# Patient Record
Sex: FEMALE | Race: WHITE | Employment: OTHER | URBAN - METROPOLITAN AREA
[De-identification: names, ages, dates, MRNs, and addresses within clinical notes are randomized per-mention and may not be internally consistent; named-entity substitution may affect disease eponyms.]

---

## 2020-08-24 ENCOUNTER — HOSPITAL ENCOUNTER (OUTPATIENT)
Dept: PHYSICAL THERAPY | Age: 80
Discharge: HOME | End: 2020-08-24
Payer: MEDICARE

## 2020-08-24 PROCEDURE — 97161 PT EVAL LOW COMPLEX 20 MIN: CPT

## 2020-08-24 PROCEDURE — 97530 THERAPEUTIC ACTIVITIES: CPT

## 2020-08-24 PROCEDURE — 97110 THERAPEUTIC EXERCISES: CPT

## 2020-08-24 NOTE — PROGRESS NOTES
PHYSICAL THERAPY - DAILY TREATMENT NOTE  Patient Name: Shelly Coleman        Date: 2020  : 1940   [x]  Patient  Verified  Visit #:     Insurance: Payor: Justine Hind / Plan: [place] MEDICARE PART A & B / Product Type: Medicare /      In time:   11:00          Out time:   11:40 Total Treatment Time (min):   40   Medicare Time Tracking (below)   Total Timed Codes (min):  23 1:1 Treatment Time:  23     SUBJECTIVE    Pain Level (on 0 to 10 scale):  1-2  / 10   Medication Changes/New allergies or changes in medical history, any new surgeries or procedures? []  No    []  Yes   If yes, update Summary List:    Subjective Functional Status/Changes:  []  No changes reported       HISTORY    Present Symptoms: generalized weakness and soreness    Present since:  2020  [] Improving []  Unchanging []  Worsening        Commenced as a result of: L/S surgery. Had several years of pain. Had a back surgery a few years ago, but unsuccessful. or  []  No apparent reason    Symptoms at onset:  [x] Back []  Thigh []  Leg     Constant symptoms:  [x] Back []  Thigh []  Leg       Intermittent symptoms:  [] Back [x]  Thigh []  Leg     Worse:  [x] Bending []  Walking []  Lying   [x] Sitting/ Rising [x]  Standing [] When still   []  Am/ as the day progresses/ pm []  On the move []Other:     Other:      Better:   [] Bending [x]  Walking [x]  Lying   [] Sitting/ Rising []  Standing [] When still   []  Am/ as the day progresses/ pm [x]  On the move []Other:       Other:    Disturbed sleep: [x] No    [] Yes       Sleeping Postures:  []  Prone [x]  Supine   []  R side [] L side    [] Toss and Turn [] OOB     Surface: N/A []   [] Soft []  Firm []  Saggy     Current Treatment: Had home health PT for a day, had stayed with daughter for 8 weeks, who had a pool and was able to walk in the water.      Number of previous episodes: recurrent      SPECIFIC QUESTIONS    [] Cough []  Deep breath [x]  -ve   [] Sneeze []  + ve Bladder:  [] Normal []  Abnormal     Gait:  [x] Normal []  Abnormal     General Health:  [x] Good []  Fair []  Poor   Unexplained weight loss:  [] Yes []  No     [] Yes []  No     6)Intolerance of reactions to treatment(s)? [] Yes []  No       7) Emergency admission(s) to hospital with simple backache?  [] Yes []  No       Work:  Mechanical Stresses: retired    Leisure: Mechanical Stresses: lives alone     Functional disability from present episode: 12 weeks in brace   Has a few steps into garage, has rails. Functional disability score- See FS FOTO score       OBJECTIVE  EXAMINATION  Posture:  Sitting  [x] Good []  Fair []  Poor     Standing:  [] Good []  Fair []  Poor     Lordosis:  [] Red []  Acc []  Normal       Lateral shift:  [] Right []  Left []  Nil     Correction of posture:  [] Better [] Worse []  No effect     Other observations:      Neurological:    Myotome Level Muscles Nerve Reflex Sensation Action   L1-L3 Iliopsoas T12/L1-3  Quadriceps L2-4  Adductors L2-L4 (Iliacus)- Femoral  Femoral  Obturator/Sciatic N/A  L3-4 = Patella L1- Inguinal Crease  L2- Anterior Thigh  L3- Anterior Thigh above knee Hip Flexion  Knee Extension   L4 Tibialis anterior L4&5   Deep Peroneal Patella Anterior Knee Suprapatellar Ankle Dorsiflexion   L5 Extensor Hallucis Longus  Extensor Digitorum Lungus  Gluteus Medius Deep Peroneal         Superior Gluteal None Reliable Dorsum of Foot/Great Toe  Anterior Shank Extensor  to Great Toe        Hip Internal Rotation   S1 Peroneus Longus  Gastrocnemius & Soleus  Gluteus Reece Superficial Peroneal  Tibial    Inferior Gluteal Achilles Tendon Lateral Shank around Lateral Malleolus  Lateral Aspect/Dorsum of GT   Plantar Flexion      Hip Extension   Notable findings from above: generalized weakness to BLE  Dural signs:      [place] String:       [x] R  [] +    [x] -    [] L    [] +    [x] -      ASLR:              [x] R  [] +    [x] -    [x] L    [] +    [x] -  0-35= no dural movement, tension onset to sciatic roots at 35 degrees. Sciatic roots tense over intervertebral discs 35-70 degrees. > 70 degrees practically no further deformation of roots. Bragard's Sign [x] R  [] +    [x] -    [x] L    [] +    [x] - (Take off flexion, then dorsiflex)      Domingo's Sign   R  [x] +    [x] -          [x] L    [] +    [x] - (Take off flexion, then have patient flex neck)    Slump test :       [x] R   [] +    [x] -    [x] L    [] +    [x] - (sensitivity 44-70%; specificity 23-66%)  Other Tests:    [place]:  Gaenslen's: [x] R    [] +    [] -   [x] L    [] +    [] -     Compression: [x] R    [] +    [] -    [x] L    [] +    [] -      Gapping/Distraction:  [] +    [] -     Thigh Thrust: [x] R [] +    [] -    [x] L    [] +    [] -     Sacral Thrust  [x] R [] +    [] -    [x] L    [] +    [] -          Hip: Tiffany Nelida:  [x] R [] +    [] -    [x] L    [] +    [] -     Scour:  [x] R [] +    [] -    [x] L    [] +    [] -     Piriformis: [x] R [] +    [] -    [x] L    [] +    [] -          Deficits: Hamstrings 90/90[de-identified] [x] R [] +    [] -    [x] L    [] +    [] -     Genaro:    [x] R  [] +    [] -   [x] L    [] +    [] -       FTSS = 24 seconds    Therapeutic Procedures:  Min Procedure Specifics + Rationale   n/a [x]  Patient Education (performed throughout session) [x] Review HEP    [] Progressed/Changed HEP based on:   [] proper performance and advancement of Therex/TA   [] reduction in pain level    [] increased functional capacity       [] change in directional preference   15(15) [x] Therapeutic Exercise   [x]  See Flowsheet   Rationale: increase ROM and increase strength to improve the patients ability to participate in ADL's    8(8) [x] Therapeutic Activity   [x]  See Flowsheet  Posture, positioning, and transfers    Rationale:  To improve safety, proprioception, coordination, and efficiency with tasks       Post Treatment Pain Level (on 0 to 10) scale:   0  / 10     ASSESSMENT      min Patient Education:  Won Jamison Reviewed HEP   []  Progressed/Changed HEP based on:          ASSESSMENT    Assessment  Justification for Eval Code Complexity:  Patient History (low 0, mod 1-2, high 3-4): high  Examination (low 1-2, mod 3+, high 4+): high  Clinical Presentation (low stable or uncomplicated, mod evolving or changing, high unstable or unpredictable): low  Clinical Decision Making (low , mod 26-74, high 1-25): FOTO mod      []  See Progress Note/Recertification   Patient will continue to benefit from skilled PT services to : SEE POC   Progress toward goals / Updated goals:    See POC     PLAN    []  Upgrade activities as tolerated YES Continue plan of care   []  Discharge due to :    [x]  Other: Initiate POC     Therapist: Mahogany Webster \"CHRISTIN\" JAN Myers, Cert. MDT, Cert. DN, Cert.  SMT    Date: 8/24/2020 Time: 10:34 AM     Future Appointments   Date Time Provider Napoleon Ahmadi   8/24/2020 11:00 AM Angeles Mccray, PT [facility]

## 2020-08-24 NOTE — PROGRESS NOTES
[address] [facility] PHYSICAL THERAPY  [address] [facility] [place] [address], [address] - Phone: [phone]  Fax: [phone] / [address] [facility]  Patient Name: Afshin Pathak : 1940   Medical   Diagnosis: Low back pain [M54.5]  Status post lumbar spine operation [Z98.890] Treatment Diagnosis: S/P L/S Laminectomy   Onset Date: DOS 2020     Referral Source: Janice Jorge MD [facility]): 2020   Prior Hospitalization: See medical history Provider #: [id number]   Prior Level of Function: Functionally independent   Comorbidities: Hx prior L/S sx, Thyroid, OA, Dizziness   Medications: Verified on Patient Summary List   The Plan of Care and following information is based on the information from the initial evaluation.   ===========================================================================================  Assessment / key information: Patient is a 68 y.o. female presenting to clinic in lumbar brace S/P L/S surgery. She reports staying with her daughter after her surgery, and had walked in their pool as her primary method of exercise during the early part of her recovery. She has since moved back home, but reports she is unable to perform any household chores independently due to deconditioning and LE weakness. Patient reports mild/moderate pain with ADL's, with limitations in prolonged sitting/static positioning. Five Times Sit to Stand score = 24, indicating increased risk for falls. Pt  will benefit from physical therapist management to address her impairments (listed below),  educate her, and improve her level of function.  Thanks for your referral.   ===========================================================================================  Eval Complexity: History: HIGH Complexity :3+ comorbidities / personal factors will impact the outcome/ POC Exam:HIGH Complexity : 4+ Standardized tests and measures addressing body structure, function, activity limitation and / or participation in recreation  Presentation: LOW Complexity : Stable, uncomplicated  Clinical Decision Making:MEDIUM Complexity : FOTO score of 26-74Overall Complexity:LOW   Problem List: pain affecting function, decrease ROM, decrease strength, impaired gait/ balance, decrease ADL/ functional abilitiies, decrease activity tolerance, decrease flexibility/ joint mobility and decrease transfer abilities  FOTO score: 34 indicating 46% functional disability  Treatment Plan may include any combination of the following: Therapeutic exercise, Therapeutic activities, Neuromuscular re-education, Physical agent/modality, Gait/balance training, Patient education, Self Care training, Functional mobility training, Home safety training and Stair training  Patient / Family readiness to learn indicated by: asking questions, trying to perform skills and interest  Persons(s) to be included in education: patient (P)  Barriers to Learning/Limitations: yes;  other Dizziness/Vertigo  Measures taken: Treatment modified as necessary   Patient Goal (s): \"move around better on my own and get stronger\"   Patient self reported health status: good  Rehabilitation Potential: excellent   Short Term Goals: To be accomplished in  3  weeks:  1. Patient to be adherent to HEP to facilitate pain control with ADL's.  2. Patient to improve five times sit <-->stand score to < 19\" to indicate reduced risk for falls. 3. Patient to demonstrate safe and proper mobility to allow for minimal sleep disturbance. 4. Patient to report no difficulty in donning/doffing LE clothing and shoes/socks.  Long Term Goals: To be accomplished in  6  weeks:  1. Patient to be Safe and Independent with HEP to self-manage/prevent symptoms after DC. 2. Patient to demonstrate five times sit <-->stand score to < 14\" to indicate increased independence and safety in community.    3. Patient to increase FOTO score to > 52 to indicate improved functional independence. 4. Patient to demonstrate safe stair negotiation in reciprocal pattern without safety concerns. Frequency / Duration:   Patient to be seen  w  times per week for 6  weeks:  Patient / Caregiver education and instruction: activity modification and exercises. We reviewed our facility's Patient Personal Responsibilities (PPR) form, particularly in regards to compliance towards her appointment time, our attendance policy, and her home exercise program. Patient was informed of possible discharge for non-compliance to our attendance policy per PPR form. We also discussed her POC as deemed appropriate by the treating therapist and physician. Patient verbalized understanding that her must show objective and functional improvement in an appropriate time frame. Patient verbalized understanding that should progress or compliance be lacking, we will contact the referring physician for further consultation to address and attempt to establish alternate treatment strategies as necessary and/or possibly discharge. Therapist Signature: Shoaib Chahal \"BJ\" Dung Chavez, DPT, Cert. MDT, Cert. DN, Cert. SMT, Dip. Osteopractic Date: 6/46/1395   Certification Period: 8/24/2020-11/23/2020 Time: 11:55 AM   ===========================================================================================  I certify that the above Physical Therapy Services are being furnished while the patient is under my care. I agree with the treatment plan and certify that this therapy is necessary. Physician Signature:        Date:       Time:     Please sign and return to [employer] or you may fax the signed copy to [phone]. Thank you.

## 2020-08-27 ENCOUNTER — HOSPITAL ENCOUNTER (OUTPATIENT)
Dept: PHYSICAL THERAPY | Age: 80
Discharge: HOME | End: 2020-08-27
Payer: MEDICARE

## 2020-08-27 PROCEDURE — 97110 THERAPEUTIC EXERCISES: CPT

## 2020-08-27 NOTE — PROGRESS NOTES
PHYSICAL THERAPY - DAILY TREATMENT NOTE    Patient Name: Nohemi Raman        Date: 2020  : 1940   YES Patient  Verified  Visit #:   2   of     Insurance: Payor: Pedro Pablo Roland / Plan: [place] MEDICARE PART A & B / Product Type: Medicare /      In time: 11:43early Out time: 12:20   Total Treatment Time: 37     Medicare/BCBS Time Tracking (below)   Total Timed Codes (min):  37 1:1 Treatment Time:  27     TREATMENT AREA = Low back pain [M54.5]  Status post lumbar spine operation [Z98.890]    SUBJECTIVE    Pain Level (on 0 to 10 scale):  0  / 10   Medication Changes/New allergies or changes in medical history, any new surgeries or procedures? NO    If yes, update Summary List   Subjective Functional Status/Changes:  []  No changes reported     \"I'm actually doing pretty good. No pain. I think the exercises helped\"          OBJECTIVE     Therapeutic Procedures:  Min Procedure Specifics + Rationale   n/a [x]  Patient Education (performed throughout session) [x] Review HEP    [] Progressed/Changed HEP based on:   [] proper performance and advancement of Therex/TA   [] reduction in pain level    [] increased functional capacity       [] change in directional preference   27(27) [x] Therapeutic Exercise   [x]  See Flowsheet   Rationale: increase ROM and increase strength to improve the patients ability to participate in ADL's      Modality rationale: decrease inflammation, decrease pain, increase tissue extensibility and increase muscle contraction/control to improve the patients ability to perform ADL's with greater ease       Min Type Additional Details   10 [x]  Heat         [] pre-FABIAN      [x] post-FABIAN Location:L/S    [] supine              [] prone     [] legs elevated    [] legs flat   [] sitting   [x] Skin assessment post-treatment:  [x]intact [x]redness- no adverse reaction       []redness  adverse reaction:     Other Objective/Functional Measures:    Provided new therex per flow sheet. Reviewed initial HEP from eval     Post Treatment Pain Level (on 0 to 10) scale:   0  / 10     ASSESSMENT    Assessment/Changes in Function:       Performed therex well with no complaints aside from muscle fatigue         Patient will continue to benefit from skilled PT services to modify and progress therapeutic interventions, address functional mobility deficits, address ROM deficits, address strength deficits, analyze and address soft tissue restrictions, analyze and cue movement patterns, analyze and modify body mechanics/ergonomics and instruct in home and community integration  to attain remaining goals   Progress toward goals / Updated goals:    1st session since initial eval, no significant progress noted in return to function        PLAN    [x]  Upgrade activities as tolerated  [x]  Update interventions per flow sheet YES Continue plan of care   []  Discharge due to :    []  Other:      Therapist: Monica Candelaria \"BJ\" Ronaldo Albrecht, JAN, Fadumo [address]. MDT, Cert. DN, Cert. SMT, Dip.  Osteopractic    Date: 8/27/2020 Time: 11:20 AM     Future Appointments   Date Time Provider Napoleon Ahmadi   8/27/2020 11:45 AM Kishor Conception, PT [facility]   9/1/2020  9:30 AM Paige Lauren, PT [facility]   9/3/2020  9:30 AM Kishor Conception, PT [facility]   9/8/2020  9:30 AM Paige Lauren, PT [facility]   9/10/2020  9:30 AM Kishor Conception, PT [facility]   9/15/2020  9:30 AM Kishor Conception, PT [facility]   9/17/2020  9:30 AM Kishor Conception, PT [facility]   9/22/2020  9:30 AM Manas Cue, PTA [facility]   9/24/2020  9:30 AM Manas Cue, PTA [facility]   9/29/2020  9:30 AM Kishor Conception, PT [facility]

## 2020-09-01 ENCOUNTER — HOSPITAL ENCOUNTER (OUTPATIENT)
Dept: PHYSICAL THERAPY | Age: 80
Discharge: HOME | End: 2020-09-01
Payer: MEDICARE

## 2020-09-01 PROCEDURE — 97530 THERAPEUTIC ACTIVITIES: CPT

## 2020-09-01 PROCEDURE — 97110 THERAPEUTIC EXERCISES: CPT

## 2020-09-01 NOTE — PROGRESS NOTES
PHYSICAL THERAPY - DAILY TREATMENT NOTE    Patient Name: Won Shea        Date: 2020  : 1940   YES Patient  Verified  Visit #:   3   of     Insurance: Payor: Elham Willard / Plan: [place] MEDICARE PART A & B / Product Type: Medicare /      In time: 9:30 Out time: 10:20   Total Treatment Time: 50     Medicare/BCBS [place] Time Tracking (below)   Total Timed Codes (min):  40 1:1 Treatment Time:  40     TREATMENT AREA =  L/S S/P    SUBJECTIVE    Pain Level (on 0 to 10 scale):  0  / 10   Medication Changes/New allergies or changes in medical history, any new surgeries or procedures? NO    If yes, update Summary List   Subjective Functional Status/Changes:  []  No changes reported     \"I did good after last time.  Im stiff when I wake up bu tht emore Im upright the better it feels\"          OBJECTIVE    Modalities Rationale:    increase tissue extensibility to improve patient's ability to decrease post exercise tension   min [] Estim, type/location:                                      []  att     []  unatt     []  w/US     []  w/ice    []  w/heat    min []  Mechanical Traction: type/lbs                   []  pro   []  sup   []  int   []  cont    []  before manual    []  after manual    min []  Ultrasound, settings/location:      min []  Iontophoresis w/ dexamethasone, location:                                               []  take home patch-6hour remove at        []  in clinic   10 min []  Ice     [x]  Heat    location/position: Supine with wedge    min []  Vasopneumatic Device, press/temp:     min []  Other:    [x] Skin assessment post-treatment (if applicable):    [x]  intact    []  redness- no adverse reaction     []redness  adverse reaction:      28 min Therapeutic Exercise:  [x]  See flow sheet   Rationale:      increase ROM and increase strength to improve the patients ability to perform ADLs with increased ease     10 min Therapeutic Activity: Stand hip three way   Rationale:   Improve ability to stabilize on stand limb during gait   min Patient Education:  YES  Reviewed HEP   []  Progressed/Changed HEP based on:   Cont HEP     Other Objective/Functional Measures: Added standing activities per flow sheet. VC for upright posture. Able to feel palpable TA draw although weak        Post Treatment Pain Level (on 0 to 10) scale:   0  / 10     ASSESSMENT    Assessment/Changes in Function:     Good otolerance      []  See Progress Note/Recertification   Patient will continue to benefit from skilled PT services to analyze, cue, progress, modify,, demonstrate, instruct, and address, movement patterns, therapeutic interventions, postural abnormalities, soft tissue restrictions, ROM, strength, functional mobility, body mechanics/ergonomics, and home and community integration, to attain remaining goals.    Progress toward goals / Updated goals:    Reports compliance with HEP     PLAN    []  Upgrade activities as tolerated YES Continue plan of care   []  Discharge due to :    []  Other:      Therapist: Bc Ye DPT    Date: 9/1/2020 Time: 9:24 AM     Future Appointments   Date Time Provider Napoleon Ahmadi   9/1/2020  9:30 AM Geraldine Lau, PT [facility]   9/3/2020  9:30 AM Gavin Salmon, PT [facility]   9/8/2020  9:30 AM Geraldine Lau PT [facility]   9/10/2020  9:30 AM Gavin Salmon, PT [facility]   9/15/2020  9:30 AM Gavin Salmon, PT [facility]   9/17/2020  9:30 AM Mallfélix Salmon, PT [facility]   9/22/2020  9:30 AM Andrzej Poli, PTA [facility]   9/24/2020  9:30 AM Andrzej Joshua, PTA [facility]   9/29/2020  9:30 AM Gavin Salmon, PT [facility]

## 2020-09-03 ENCOUNTER — HOSPITAL ENCOUNTER (OUTPATIENT)
Dept: PHYSICAL THERAPY | Age: 80
Discharge: HOME | End: 2020-09-03
Payer: MEDICARE

## 2020-09-03 PROCEDURE — 97110 THERAPEUTIC EXERCISES: CPT

## 2020-09-03 NOTE — PROGRESS NOTES
PHYSICAL THERAPY - DAILY TREATMENT NOTE    Patient Name: Daniela Sánchez        Date: 9/3/2020  : 1940   YES Patient  Verified  Visit #:   4     Insurance: Payor: Vee Feeler / Plan: [place] MED[place] & B / Product Type: Medicare /      In time: 9:30 Out time: 10:34   Total Treatment Time: 64     Medicare/BCBS Time Tracking (below)   Total Timed Codes (min):  64 1:1 Treatment Time:  44     TREATMENT AREA = Low back pain [M54.5]  Status post lumbar spine operation [Z98.890]    SUBJECTIVE    Pain Level (on 0 to 10 scale):  0  / 10   Medication Changes/New allergies or changes in medical history, any new surgeries or procedures? NO    If yes, update Summary List   Subjective Functional Status/Changes:  []  No changes reported     \"Can I start sleeping on my back? \"          OBJECTIVE     Therapeutic Procedures:  Min Procedure Specifics + Rationale   n/a [x]  Patient Education (performed throughout session) [x] Review HEP    [] Progressed/Changed HEP based on:   [] proper performance and advancement of Therex/TA   [] reduction in pain level    [] increased functional capacity       [] change in directional preference   54 [x] Therapeutic Exercise   [x]  See Flowsheet   Rationale: increase ROM and increase strength to improve the patients ability to participate in ADL's      Modality rationale: decrease inflammation, decrease pain, increase tissue extensibility and increase muscle contraction/control to improve the patients ability to perform ADL's with greater ease       Min Type Additional Details   10 [x]  Heat         [] pre-FABIAN      [x] post-FABIAN Location:L/S    [] supine              [] prone     [] legs elevated    [] legs flat   [] sitting   [x] Skin assessment post-treatment:  [x]intact [x]redness- no adverse reaction       []redness  adverse reaction:     Other Objective/Functional Measures:    Progressed iso hip flexion to SLR, added DD march/, added OTB to H/L Abduction  Discussed sleeping positions\  Assisted patient in tempo count as she confuses 30\" stretches with 5\" isometrics   Post Treatment Pain Level (on 0 to 10) scale:   0  / 10     ASSESSMENT    Assessment/Changes in Function:       Performed all therex well despite decreased attention to task re: count. Patient will continue to benefit from skilled PT services to modify and progress therapeutic interventions, address functional mobility deficits, address ROM deficits, address strength deficits, analyze and address soft tissue restrictions, analyze and cue movement patterns, analyze and modify body mechanics/ergonomics and instruct in home and community integration  to attain remaining goals   Progress toward goals / Updated goals:    Progressing well in reduction of pain for ADL's     PLAN    [x]  Upgrade activities as tolerated  [x]  Update interventions per flow sheet YES Continue plan of care   []  Discharge due to :    []  Other:      Therapist: Pawel Tan \"BJ\" Kanika Ware, DPT, Cert. MDT, Cert. DN, Cert. SMT, Dip.  Osteopractic    Date: 9/3/2020 Time: 10:05 AM     Future Appointments   Date Time Provider Napoleon Ahmadi   9/8/2020  9:30 AM Tamica Dubon, PT [facility]   9/10/2020  9:30 AM Michael Crocker PT [facility]   9/15/2020  9:30 AM Michael Crocker PT [facility]   9/17/2020  9:30 AM Michael Crocker PT [facility]   9/22/2020  9:30 AM Tracy Garcia PTA [facility]   9/24/2020  9:30 AM ANA PAULA Gregorio [facility]   9/29/2020  9:30 AM Michael Crocker PT [facility]

## 2020-09-08 ENCOUNTER — HOSPITAL ENCOUNTER (OUTPATIENT)
Dept: PHYSICAL THERAPY | Age: 80
Discharge: HOME | End: 2020-09-08
Payer: MEDICARE

## 2020-09-08 PROCEDURE — 97110 THERAPEUTIC EXERCISES: CPT

## 2020-09-08 PROCEDURE — 97530 THERAPEUTIC ACTIVITIES: CPT

## 2020-09-08 NOTE — PROGRESS NOTES
PHYSICAL THERAPY - DAILY TREATMENT NOTE    Patient Name: Afshin Pathak        Date: 2020  : 1940   YES Patient  Verified  Visit #:   5     Insurance: Payor: Idolina Babinski / Plan: [place] MEDICARE PART A & B / Product Type: Medicare /      In time: 9:30 Out time: 10:20   Total Treatment Time: 50     Medicare/BCBS [place] Time Tracking (below)   Total Timed Codes (min):  40 1:1 Treatment Time:  40     TREATMENT AREA =  L/S    SUBJECTIVE    Pain Level (on 0 to 10 scale):  0  / 10   Medication Changes/New allergies or changes in medical history, any new surgeries or procedures? NO    If yes, update Summary List   Subjective Functional Status/Changes:  []  No changes reported     \"I had a nice weekend.  I didn't have to use my cane in the community\"          OBJECTIVE    Modalities Rationale:    decrease pain and increase tissue extensibility to improve patient's ability to decrease post exercise tension    min [] Estim, type/location:                                      []  att     []  unatt     []  w/US     []  w/ice    []  w/heat    min []  Mechanical Traction: type/lbs                   []  pro   []  sup   []  int   []  cont    []  before manual    []  after manual    min []  Ultrasound, settings/location:      min []  Iontophoresis w/ dexamethasone, location:                                               []  take home patch-6hour remove at        []  in clinic   10 min []  Ice     [x]  Heat    location/position: Supine with wedge    min []  Vasopneumatic Device, press/temp:     min []  Other:    [x] Skin assessment post-treatment (if applicable):    [x]  intact    []  redness- no adverse reaction     []redness  adverse reaction:      30 min Therapeutic Exercise:  [x]  See flow sheet   Rationale:      increase ROM and increase strength to improve the patients ability to amb and perform ADLs with increased ease      10 min Therapeutic Activity: amb activities, sit<>stand   Rationale: Increase balance   min Patient Education:  YES  Reviewed HEP   []  Progressed/Changed HEP based on:   Cont HEP     Other Objective/Functional Measures:    Challenged with retro amb. Multi LOB but able to self correct with CGA. VC and visual demo for increased left LE retro step length and pt perform right full step length and left to meet right   Changed supine scap stabs to seated on DD for posture upright control     Post Treatment Pain Level (on 0 to 10) scale:   0  / 10     ASSESSMENT    Assessment/Changes in Function:     Relevant decreased balance noted today     []  See Progress Note/Recertification   Patient will continue to benefit from skilled PT services to analyze, cue, progress, modify,, demonstrate, instruct, and address, movement patterns, therapeutic interventions, postural abnormalities, soft tissue restrictions, ROM, strength, functional mobility, body mechanics/ergonomics, and home and community integration, to attain remaining goals. Progress toward goals / Updated goals:     Added sit<>stand today for STG 2     PLAN    []  Upgrade activities as tolerated YES Continue plan of care   []  Discharge due to :    []  Other:      Therapist: Karl Stout DPT    Date: 9/8/2020 Time: 10:00 AM     Future Appointments   Date Time Provider Napoleon Ahmadi   9/10/2020  9:30 AM Barbara Santoro PT [facility]   9/15/2020  9:30 AM Barbara Santoro PT [facility]   9/17/2020  9:30 AM Barbara Santoro PT [facility]   9/22/2020  9:30 AM Massiel Pearson PTA [facility]   9/24/2020  9:30 AM ANA PAULA Tellez [facility]   9/29/2020  9:30 AM Barbara Santoro PT [facility]

## 2020-09-10 ENCOUNTER — HOSPITAL ENCOUNTER (OUTPATIENT)
Dept: PHYSICAL THERAPY | Age: 80
Discharge: HOME | End: 2020-09-10
Payer: MEDICARE

## 2020-09-10 PROCEDURE — 97530 THERAPEUTIC ACTIVITIES: CPT

## 2020-09-10 PROCEDURE — 97110 THERAPEUTIC EXERCISES: CPT

## 2020-09-15 ENCOUNTER — HOSPITAL ENCOUNTER (OUTPATIENT)
Dept: PHYSICAL THERAPY | Age: 80
Discharge: HOME | End: 2020-09-15
Payer: MEDICARE

## 2020-09-15 PROCEDURE — 97110 THERAPEUTIC EXERCISES: CPT

## 2020-09-15 PROCEDURE — 97530 THERAPEUTIC ACTIVITIES: CPT

## 2020-09-15 NOTE — PROGRESS NOTES
PHYSICAL THERAPY - DAILY TREATMENT NOTE    Patient Name: Radha Valdivia        Date: 9/15/2020  : 1940   YES Patient  Verified  Visit #:    Insurance: Payor: Tony Swan / Plan: [place] MEDICARE PART A & B / Product Type: Medicare /      In time: 9:16early Out time: 10:10   Total Treatment Time: 54     Medicare/BCBS Time Tracking (below)   Total Timed Codes (min):  54 1:1 Treatment Time:  54     TREATMENT AREA = Low back pain [M54.5]  Status post lumbar spine operation [Z98.890]    SUBJECTIVE    Pain Level (on 0 to 10 scale):  0  / 10   Medication Changes/New allergies or changes in medical history, any new surgeries or procedures? NO    If yes, update Summary List   Subjective Functional Status/Changes:  []  No changes reported     \"I've been getting stronger. Just got a little bit of hurting in the back when I stand too long, it tightens up. . \"          OBJECTIVE     Therapeutic Procedures:  Min Procedure Specifics + Rationale   n/a [x]  Patient Education (performed throughout session) [x] Review HEP    [] Progressed/Changed HEP based on:   [] proper performance and advancement of Therex/TA   [] reduction in pain level    [] increased functional capacity       [] change in directional preference   38(38) [x] Therapeutic Exercise   [x]  See Flowsheet   Rationale: increase ROM and increase strength to improve the patients ability to participate in ADL's    16(16) [x] Therapeutic Activity   [x]  See Flowsheet  Rationale: To improve safety, proprioception, coordination, and efficiency with tasks       [x] Skin assessment post-treatment:  [x]intact [x]redness- no adverse reaction       []redness  adverse reaction:     Other Objective/Functional Measures:     Added new therex per flow sheet     Post Treatment Pain Level (on 0 to 10) scale:   0  / 10     ASSESSMENT    Assessment/Changes in Function:       Transient LBP with pallof,          Patient will continue to benefit from skilled PT services to modify and progress therapeutic interventions, address functional mobility deficits, address ROM deficits, address strength deficits, analyze and address soft tissue restrictions, analyze and cue movement patterns, analyze and modify body mechanics/ergonomics and instruct in home and community integration  to attain remaining goals   Progress toward goals / Updated goals:    Progressing well in standing tolerance     PLAN    [x]  Upgrade activities as tolerated  [x]  Update interventions per flow sheet YES Continue plan of care   []  Discharge due to :    []  Other:      Therapist: Maryanne Munoz \"BJ\" JAN Myers, Cert. MDT, Cert. DN, Cert. SMT, Dip.  Osteopractic    Date: 9/15/2020 Time: 9:28 AM     Future Appointments   Date Time Provider Napoleon Ahmadi   9/15/2020  9:30 AM Aracely Landry, PT [facility]   9/17/2020  9:30 AM Aracely Landry PT [facility]   9/22/2020  9:30 AM Karel Andrea PTA [facility]   9/24/2020  9:30 AM Karel Andrea, PTA [facility]   9/29/2020  9:30 AM Aracely Landry PT [facility]

## 2020-09-17 ENCOUNTER — HOSPITAL ENCOUNTER (OUTPATIENT)
Dept: PHYSICAL THERAPY | Age: 80
Discharge: HOME | End: 2020-09-17
Payer: MEDICARE

## 2020-09-17 PROCEDURE — 97530 THERAPEUTIC ACTIVITIES: CPT

## 2020-09-17 PROCEDURE — 97110 THERAPEUTIC EXERCISES: CPT

## 2020-09-17 NOTE — PROGRESS NOTES
PHYSICAL THERAPY - DAILY TREATMENT NOTE    Patient Name: Raven Mayberry        Date: 2020  : 1940   YES Patient  Verified  Visit #:      12  Insurance: Payor: Beverly Oyster / Plan: [place] MEDICARE PART A & B / Product Type: Medicare /      In time: 9:24early Out time: 10:20   Total Treatment Time: 56     Medicare/BCBS Time Tracking (below)   Total Timed Codes (min):  56 1:1 Treatment Time:  53     TREATMENT AREA = Low back pain [M54.5]  Status post lumbar spine operation [Z98.890]    SUBJECTIVE    Pain Level (on 0 to 10 scale):  2   10   Medication Changes/New allergies or changes in medical history, any new surgeries or procedures? NO    If yes, update Summary List   Subjective Functional Status/Changes:  []  No changes reported     \"Stiff this morning. Maybe the weather? \"          OBJECTIVE     Therapeutic Procedures:  Min Procedure Specifics + Rationale   n/a [x]  Patient Education (performed throughout session) [x] Review HEP    [] Progressed/Changed HEP based on:   [] proper performance and advancement of Therex/TA   [] reduction in pain level    [] increased functional capacity       [] change in directional preference   40(38) [x] Therapeutic Exercise   [x]  See Flowsheet   Rationale: increase ROM and increase strength to improve the patients ability to participate in ADL's    15(15) [x] Therapeutic Activity   [x]  See Flowsheet  Tap ups  Step ups  HK/Retro/SS  March in Place  Rationale: To improve safety, proprioception, coordination, and efficiency with tasks         Other Objective/Functional Measures:    Increased reps/sets/resistance per flow sheet. Report of right inner thigh pain with LAQ on , but transient. Discussed scheduling in October, patient wishes to defer to when she is re-assessed on 2020.       Post Treatment Pain Level (on 0 to 10) scale:   0  / 10     ASSESSMENT    Assessment/Changes in Function:       Noted weakness and unsteadiness when performing step ups without UE assist.          Patient will continue to benefit from skilled PT services to modify and progress therapeutic interventions, address functional mobility deficits, address ROM deficits, address strength deficits, analyze and address soft tissue restrictions, analyze and cue movement patterns, analyze and modify body mechanics/ergonomics, assess and modify postural abnormalities, address imbalance/dizziness and instruct in home and community integration  to attain remaining goals   Progress toward goals / Updated goals:    Steady progress in uninterrupted standing tolerance. PLAN    [x]  Upgrade activities as tolerated  [x]  Update interventions per flow sheet YES Continue plan of care   []  Discharge due to :    []  Other:      Therapist: Brook Jackson \"BJ\" JAN Myers, Cert. MDT, Cert. DN, Cert. SMT, Dip.  Osteopractic    Date: 9/17/2020 Time: 9:39 AM     Future Appointments   Date Time Provider Napoleon Ahmadi   9/22/2020  9:30 AM Demetrio Gonzalez [facility]   9/24/2020  9:30 AM Sara Mckeon PTA [facility]   9/29/2020  9:30 AM Bobby Keen PT [facility]

## 2020-09-22 ENCOUNTER — HOSPITAL ENCOUNTER (OUTPATIENT)
Dept: PHYSICAL THERAPY | Age: 80
Discharge: HOME | End: 2020-09-22
Payer: MEDICARE

## 2020-09-22 PROCEDURE — 97530 THERAPEUTIC ACTIVITIES: CPT

## 2020-09-22 PROCEDURE — 97110 THERAPEUTIC EXERCISES: CPT

## 2020-09-22 NOTE — PROGRESS NOTES
PHYSICAL THERAPY - DAILY TREATMENT NOTE    Patient Name: Liza Epstein        Date: 2020  : 1940   yes Patient  Verified  Visit #:     Insurance: Payor: Margarita Gal / Plan: [place] MEDICARE PART A & B / Product Type: Medicare /      In time: 930 Out time: [id number]   Total Treatment Time: 60     Medicare/BCBS Time Tracking (below)   Total Timed Codes (min):  50 1:1 Treatment Time:  50     TREATMENT AREA =  Low back pain [M54.5]  Status post lumbar spine operation [Z98.890]    SUBJECTIVE  Pain Level (on 0 to 10 scale):  0  / 10   Medication Changes/New allergies or changes in medical history, any new surgeries or procedures?    no  If yes, update Summary List   Subjective Functional Status/Changes:  []  No changes reported     \"No pain just sore since last visit\"  Pt reports daily compliance with HEP          OBJECTIVE  Modalities Rationale:     decrease inflammation and decrease pain to improve patient's ability to safely perform ADLs/ bending/stooping/ lifting/prolong sitting, stding and amb/ stairs with minimal to no pain     min [] Estim, type/location:                                      []  att     []  unatt     []  w/US     []  w/ice    []  w/heat    min []  Mechanical Traction: type/lbs                   []  pro   []  sup   []  int   []  cont    []  before manual    []  after manual    min []  Ultrasound, settings/location:      min []  Iontophoresis w/ dexamethasone, location:                                               []  take home patch       []  in clinic   10 min [x]  Ice     []  Heat    location/position: Supine with wedge under B LEs    min []  Vasopneumatic Device, press/temp:     min []  Other:    [x] Skin assessment post-treatment (if applicable):    [x]  intact    []  redness- no adverse reaction     []redness  adverse reaction:        24 min Therapeutic Exercise:  [x]  See flow sheet   Rationale:      increase ROM, increase strength, improve coordination, improve balance and increase proprioception to improve the patients ability to safely perform ADLs/ bending/stooping/ lifting/prolong sitting, stding and amb/ stairs with minimal to no pain       26 min Therapeutic Activity: [x]  See flow sheet  postural/bed mobility training  sit <>std without UE  seated piriformis str for donning/doffing shoes and socks with ease  step ups  tap ups   Rationale:    increase ROM, increase strength, improve coordination, improve balance and increase proprioception to improve the patients ability to safely perform ADLs/ bending/stooping/ lifting/prolong sitting, stding and amb/ stairs with minimal to no pain      Billed With/As:   [x] TE   [x] TA   [] Neuro   [] Self Care Patient Education: [x] Review HEP    [] Progressed/Changed HEP based on:   [x] positioning   [x] body mechanics   [x] transfers   [x] heat/ice application    [x] other: Pt ed on importance and benefits of compliance with HEP, core strength/stability and proper posture; pt verbalized understanding  issued OTB for rows/ext     Other Objective/Functional Measures:    VCs + demo to perform proper technique for TE  demos decrease trunk ext during gait and in stance     demos 80% upright posture in sitting     demos sit <>std without UE from 22'' without c/o P!    Post Treatment Pain Level (on 0 to 10) scale:   0  / 10     ASSESSMENT  Assessment/Changes in Function:     performed HRs/TRs from 2\" without c/o P!    seated rest x 1 due to dizziness; subsided after 27''       []  See Progress Note/Recertification   Patient will continue to benefit from skilled PT services to modify and progress therapeutic interventions, address functional mobility deficits, address ROM deficits, address strength deficits, analyze and address soft tissue restrictions, analyze and cue movement patterns, analyze and modify body mechanics/ergonomics, assess and modify postural abnormalities and instruct in home and community integration to attain remaining goals. Progress toward goals / Updated goals: · Short Term Goals: To be accomplished in  3  weeks:  1. Patient to be adherent to HEP to facilitate pain control with ADL's. MET- complaint with HEP  2. Patient to improve five times sit <-->stand score to < 19\" to indicate reduced risk for falls. progressing, from 25''   3. Patient to demonstrate safe and proper mobility to allow for minimal sleep disturbance. 4. Patient to report no difficulty in donning/doffing LE clothing and shoes/socks. progressing, performs seated piriformis str B sides without difficulty   · Long Term Goals: To be accomplished in  6  weeks:  1. Patient to be Safe and Independent with HEP to self-manage/prevent symptoms after DC. 2. Patient to demonstrate five times sit <-->stand score to < 14\" to indicate increased independence and safety in community. 3. Patient to increase FOTO score to > 52 to indicate improved functional independence. 4. Patient to demonstrate safe stair negotiation in reciprocal pattern without safety concerns.       PLAN  [x]  Upgrade activities as tolerated yes Continue plan of care   []  Discharge due to :    []  Other:      Therapist: Maximiliano Bruno PTA    Date: 9/22/2020 Time: 10:20 AM     Future Appointments   Date Time Provider Napoleon Ahmadi   9/24/2020  9:30 AM Kathrin Lizama PTA [facility]   9/29/2020  9:30 AM Sotero Teran PT [facility]

## 2020-09-24 ENCOUNTER — HOSPITAL ENCOUNTER (OUTPATIENT)
Dept: PHYSICAL THERAPY | Age: 80
Discharge: HOME | End: 2020-09-24
Payer: MEDICARE

## 2020-09-24 PROCEDURE — 97110 THERAPEUTIC EXERCISES: CPT

## 2020-09-24 PROCEDURE — 97530 THERAPEUTIC ACTIVITIES: CPT

## 2020-09-24 NOTE — PROGRESS NOTES
[address]  [facility] PHYSICAL THERAPY  [address] [facility] [address], [address] - Phone: [phone]  Fax: [phone] OF CARE/RECERTIFICATION FOR PHYSICAL THERAPY          Patient Name: Dixie Phalen : 1940   Treatment/Medical Diagnosis: Low back pain [M54.5]  Status post lumbar spine operation [Z98.890]   Onset Date: 2020    Referral Source: Sona Chavez MD [facility]): 2020   Prior Hospitalization: See Medical History Provider #: [id number]   Prior Level of Function: Functionally (I)    Comorbidities: Hx prior L/S sx, Thyroid, OA, Dizziness   Medications: Verified on Patient Summary List   Visits from [facility]: 10 Missed Visits: 0     Goal/Measure of Progress Goal Met? 1. Patient to be Safe and Independent with HEP to self-manage/prevent symptoms after DC. Status at last Eval:   Established HEP   Current Status: compliant with HEP progressing   2. Patient to demonstrate five times sit <-->stand score to < 14\" to indicate increased independence and safety in community. Status at last Eval: 24\" Current Status: 25'' progressing   3. Patient to increase FOTO score to > 52 to indicate improved functional independence. Status at last Eval: 34 Current Status: 60 yes   4. Patient to demonstrate safe stair negotiation in reciprocal pattern without safety concerns. Status at last Eval: unable Current Status: (I)  yes     Key Functional Changes/Progress: Dixie Phalen is progressing towards goals in PT for s/p L/s discectomy as evidence by progressing towards (I) with HEP, and increasing functional mobility in all activities. Pt improved sit<>stand test from 24\" to 18''; indicating decrease fall risk. Pt reports 50% better overall since SOC, with min p! level 0/10, and max p! 2/10. Pt reports no difficulty donning/doffing socks/shoes, and no difficulty negotiating stairs.  Pt (I) negotiating up and down 3 steps x 5  with B HandRails safely with reciprocal pattern. Pt with reports of \"a little difficulty\" carrying groceries; (I) with farmers carry x 30' ea UE with 5# KB/10 #KB. Pt reports no interrupted sleep from L/s pain. Pt reports 20 mins of walking tolerance, and moderate difficulty showering. Pt increased FOTO from 34 to 61;  indicating improved functional mobility      Problem List: pain affecting function, decrease ROM, decrease strength, edema affecting function, impaired gait/ balance, decrease ADL/ functional abilitiies, decrease activity tolerance, decrease flexibility/ joint mobility and decrease transfer abilities   Treatment Plan may include any combination of the following: Therapeutic exercise, Therapeutic activities, Neuromuscular re-education, Physical agent/modality, Gait/balance training, Manual therapy, Aquatic therapy, Patient education, Self Care training, Functional mobility training, Home safety training and Stair training  Patient Goal(s) has been updated and includes:      Goals for this certification period include and are to be achieved in   4  weeks: 1. Patient to be Safe and Independent with HEP to self-manage/prevent symptoms after DC  2. Patient to demonstrate five times sit <-->stand score to < 14\" to indicate increased independence and safety in community. 3. Pt to report no difficulty showering (I). Frequency / Duration:   Patient to be seen   2-3   times per week for   4    weeks:    Assessments/Recommendations: Pt will benefit from further skilled PT services to increase strength/stability and decrease sxs to promote functional mobility. If you have any questions/comments please contact us directly at [phone]. Thank you for allowing us to assist in the care of your patient. Therapist Signature: ANA PAULA Harrison \"BJ\" Citlalli Botello, JAN, Cert. MDT, Cert. DN, Cert. SMT, Dip.  Osteopractic Date: 7/30/1522   Certification Period:  Reporting Period: 8/24/2020-11/23/2020 8/24/2020-9/24/2020 Time: 10:40 AM   NOTE TO PHYSICIAN:  PLEASE COMPLETE THE ORDERS BELOW AND FAX TO   [facility]: [phone]. If you are unable to process this request in 24 hours please contact our office: [phone].    ___ I have read the above report and request that my patient continue as recommended.   ___ I have read the above report and request that my patient continue therapy with the following changes/special instructions: ________________________________________________   ___ I have read the above report and request that my patient be discharged from therapy.      Physician Signature:        Date:       Time:

## 2020-09-24 NOTE — PROGRESS NOTES
PHYSICAL THERAPY - DAILY TREATMENT NOTE    Patient Name: Joseph Correa        Date: 2020  : 1940   yes Patient  Verified  Visit #:   10   of   22  Insurance: Payor: Martha Slight / Plan: [place] MED[place] PART A & B / Product Type: Medicare /      In time: 409 Out time:  1026   Total Treatment Time: 51     Medicare/BCBS Time Tracking (below)   Total Timed Codes (min):  41 1:1 Treatment Time:  41     TREATMENT AREA =  Low back pain [M54.5]  Status post lumbar spine operation [Z98.890]    SUBJECTIVE  Pain Level (on 0 to 10 scale):   2  / 10   Medication Changes/New allergies or changes in medical history, any new surgeries or procedures?    no  If yes, update Summary List   Subjective Functional Status/Changes:  []  No changes reported     \"I feel about 50% better all the time. I havent had pain wake me up while I am sleeping, only bc I have to pee.  I am have trouble showering\"    Pt reports no difficulty with stair neg or donning/doffing socks/shoes  \"a little difficulty\" with carrying groceries         OBJECTIVE  Modalities Rationale:     decrease inflammation and decrease pain to improve patient's ability to safely perform ADLs/ bending/stooping/ lifting/prolong sitting, stding and amb/ stairs with minimal to no pain     min [] Estim, type/location:                                      []  att     []  unatt     []  w/US     []  w/ice    []  w/heat    min []  Mechanical Traction: type/lbs                   []  pro   []  sup   []  int   []  cont    []  before manual    []  after manual    min []  Ultrasound, settings/location:      min []  Iontophoresis w/ dexamethasone, location:                                               []  take home patch       []  in clinic   10 min [x]  Ice     []  Heat    location/position: prone under hips with wedge under B LEs    min []  Vasopneumatic Device, press/temp:     min []  Other:    [x] Skin assessment post-treatment (if applicable):    [x]  intact    []  redness- no adverse reaction     []redness  adverse reaction:        12 min Therapeutic Exercise:  [x]  See flow sheet   Rationale:      increase ROM, increase strength, improve coordination, improve balance and increase proprioception to improve the patients ability to safely perform ADLs/ bending/stooping/ lifting/prolong sitting, stding and amb/ stairs with minimal to no pain       29 min Therapeutic Activity: [x]  See flow sheet  postural/bed mobility training  sit <>std without UE  seated piriformis str for donning/doffing shoes and socks with ease  stair negotiation  FOTO assessment  bridges for bed mobility  farmers carry   Rationale:    increase ROM, increase strength, improve coordination, improve balance and increase proprioception to improve the patients ability to safely perform ADLs/ bending/stooping/ lifting/prolong sitting, stding and amb/ stairs with minimal to no pain      Billed With/As:   [x] TE   [x] TA   [] Neuro   [] Self Care Patient Education: [x] Review HEP    [] Progressed/Changed HEP based on:   [x] positioning   [x] body mechanics   [x] transfers   [x] heat/ice application    [x] other: Pt ed on importance and benefits of compliance with HEP, core strength/stability and proper posture; pt verbalized understanding  issued OTB for rows/ext     Other Objective/Functional Measures:    VCs + demo to perform proper technique for TE  FOTO=60% (SOC=34)  demos sit <>std from chair x 5x  19\"; 18\"    negotiated up and down 3 steps x 5  with B to 1 HandRail safely with reciprocal pattern    (I) with farmers carry x 30'x ea UE with 10# KB/5 #KB     Post Treatment Pain Level (on 0 to 10) scale:   0  / 10     ASSESSMENT  Assessment/Changes in Function:   See PN     []  See Progress Note/Recertification   Patient will continue to benefit from skilled PT services to modify and progress therapeutic interventions, address functional mobility deficits, address ROM deficits, address strength deficits, analyze and address soft tissue restrictions, analyze and cue movement patterns, analyze and modify body mechanics/ergonomics, assess and modify postural abnormalities and instruct in home and community integration to attain remaining goals. Progress toward goals / Updated goals: · Short Term Goals: To be accomplished in  3  weeks:  1. Patient to be adherent to HEP to facilitate pain control with ADL's. MET- complaint with HEP  2. Patient to improve five times sit <-->stand score to < 19\" to indicate reduced risk for falls. MET=19 \", 18\"  3. Patient to demonstrate safe and proper mobility to allow for minimal sleep disturbance. MET  4. Patient to report no difficulty in donning/doffing LE clothing and shoes/socks. MET  · Long Term Goals: To be accomplished in  6  weeks:  1. Patient to be Safe and Independent with HEP to self-manage/prevent symptoms after DC. 2. Patient to demonstrate five times sit <-->stand score to < 14\" to indicate increased independence and safety in community. progressing, 18''  3. Patient to increase FOTO score to > 52 to indicate improved functional independence. MET - 60  4. Patient to demonstrate safe stair negotiation in reciprocal pattern without safety concerns. MET     PLAN  [x]  Upgrade activities as tolerated yes Continue plan of care   []  Discharge due to :    []  Other: Pt will benefit from further skilled PT services 2-3x wk x 4 wks to increase strength/stability and decrease sxs to promote functional mobility.        Therapist: Hyacinth Tipton PTA    Date: 9/24/2020 Time: 10:37 AM     Future Appointments   Date Time Provider Napoleon Ahmadi   9/29/2020  9:30 AM Rosa Gore PT [facility]   10/6/2020  9:30 AM Preet Patel PTA [facility]   10/8/2020 10:15 AM Rosa Gore PT [facility]   10/13/2020 11:00 AM Rosa Gore PT [facility]   10/15/2020  9:30 AM Preet Patel PTA [facility]   10/20/2020  9:30 AM Rosa Gore, PT [facility]   10/22/2020  9:30 AM Preet Patel PTA [facility]   10/27/2020  9:30 AM Ishmael Roy, PTA [facility]   10/29/2020 10:15 AM Lukas Jain, PT [facility]

## 2020-09-29 ENCOUNTER — HOSPITAL ENCOUNTER (OUTPATIENT)
Dept: PHYSICAL THERAPY | Age: 80
Discharge: HOME | End: 2020-09-29
Payer: MEDICARE

## 2020-09-29 PROCEDURE — 97110 THERAPEUTIC EXERCISES: CPT

## 2020-09-29 PROCEDURE — 97112 NEUROMUSCULAR REEDUCATION: CPT

## 2020-09-29 PROCEDURE — 97530 THERAPEUTIC ACTIVITIES: CPT

## 2020-09-29 NOTE — PROGRESS NOTES
PHYSICAL THERAPY - DAILY TREATMENT NOTE    Patient Name: Rui Cuevas        Date: 2020  : 1940   YES Patient  Verified  Visit #:     Insurance: Payor: Edna Saver / Plan: [place] MEDICARE PART A & B / Product Type: Medicare /      In time: 9:08early Out time: 10:03   Total Treatment Time: 63     Medicare/BCBS Time Tracking (below)   Total Timed Codes (min):  63 1:1 Treatment Time:  53     TREATMENT AREA = Low back pain [M54.5]  Status post lumbar spine operation [Z98.890]    SUBJECTIVE    Pain Level (on 0 to 10 scale):  1  / 10   Medication Changes/New allergies or changes in medical history, any new surgeries or procedures? NO    If yes, update Summary List   Subjective Functional Status/Changes:  []  No changes reported     \"I'm doing well. Saw my doctor the other day and he's all in for PT. He wants to make sure I'm getting stronger and is very pleased with how I'm doing. \"          OBJECTIVE     Therapeutic Procedures:  Min Procedure Specifics + Rationale   n/a [x]  Patient Education (performed throughout session) [x] Review HEP    [] Progressed/Changed HEP based on:   [] proper performance and advancement of Therex/TA   [] reduction in pain level    [] increased functional capacity       [] change in directional preference   (33) [x] Therapeutic Exercise   [x]  See Flowsheet   Rationale: increase ROM and increase strength to improve the patients ability to participate in ADL's    10(10) [x] Therapeutic Activity   [x]  See Flowsheet  Rationale:  To improve safety, proprioception, coordination, and efficiency with tasks   10(10) [x] Neuromuscular Re-ed   [x]  See Flowsheet  Marching in place  MSR to bunion  SLS  Rationale: increase ROM, increase strength, improve coordination, improve balance and increase proprioception  to improve the patients ability to safely participate in ADL's     Modality rationale: decrease inflammation, decrease pain, increase tissue extensibility and increase muscle contraction/control to improve the patients ability to perform ADL's with greater ease       Min Type Additional Details   10 [x]  Heat         [] pre-FABIAN      [x] post-FABIAN Location:L/S    [x] supine              [] prone     [x] legs elevated    [] legs flat   [] sitting   [x] Skin assessment post-treatment:  [x]intact [x]redness- no adverse reaction       []redness  adverse reaction:     Other Objective/Functional Measures:    Increased reps/sets/resistance per flow sheet. therex revised per flow sheet     Post Treatment Pain Level (on 0 to 10) scale:   1  / 10     ASSESSMENT    Assessment/Changes in Function:       Performed new therex well without complaints. Patient will continue to benefit from skilled PT services to modify and progress therapeutic interventions, address functional mobility deficits, address ROM deficits, address strength deficits, analyze and address soft tissue restrictions, analyze and cue movement patterns, analyze and modify body mechanics/ergonomics and instruct in home and community integration  to attain remaining goals   Progress toward goals / Updated goals:    1st session since progress note. No significant progress observed       PLAN    [x]  Upgrade activities as tolerated  [x]  Update interventions per flow sheet YES Continue plan of care   []  Discharge due to :    []  Other:      Therapist: Daina Suarez \"BJ\" JNA Myers, Cert. MDT, Cert. DN, Cert. SMT, Dip.  Osteopractic    Date: 9/29/2020 Time: 9:17 AM     Future Appointments   Date Time Provider Napoleon Ahmadi   9/29/2020  9:30 AM Dylan Pickens PT [facility]   10/6/2020  9:30 AM Dulce Every, ANA PAULA [facility]   10/8/2020 10:15 AM Dylan Pickens, PT [facility]   10/13/2020 11:00 AM Dylan Pickens PT [facility]   10/15/2020  9:30 AM Dulce Every, PTA [facility]   10/20/2020  9:30 AM Dylan Pickens PT [facility]   10/22/2020  9:30 AM ANA PAULA [facility]   10/27/2020  9:30 AM Kaleb Mills PTA [facility] [id number]   10/29/2020 10:15 AM Ana Castillo, PT [facility] [id number]

## 2020-10-01 ENCOUNTER — HOSPITAL ENCOUNTER (OUTPATIENT)
Dept: PHYSICAL THERAPY | Age: 80
Discharge: HOME | End: 2020-10-01
Payer: MEDICARE

## 2020-10-01 PROCEDURE — 97110 THERAPEUTIC EXERCISES: CPT

## 2020-10-01 PROCEDURE — 97530 THERAPEUTIC ACTIVITIES: CPT

## 2020-10-01 NOTE — PROGRESS NOTES
PHYSICAL THERAPY - DAILY TREATMENT NOTE    Patient Name: Tracy Garcia        Date: 10/1/2020  : 1940   YES Patient  Verified  Visit #:     Insurance: Payor: Manjit Laws / Plan: [place] MEDICARE PART A & B / Product Type: Medicare /      In time: 9:30 Out time: 10:20   Total Treatment Time: 50     Medicare/BCBS [place] Time Tracking (below)   Total Timed Codes (min):  40 1:1 Treatment Time:  40     TREATMENT AREA =  L/S    SUBJECTIVE    Pain Level (on 0 to 10 scale):  1  / 10   Medication Changes/New allergies or changes in medical history, any new surgeries or procedures?     NO    If yes, update Summary List   Subjective Functional Status/Changes:  []  No changes reported     \"Im getting so better\"          OBJECTIVE    Modalities Rationale:    decrease pain and increase tissue extensibility to improve patient's ability to decrease post exercise tension   min [] Estim, type/location:                                      []  att     []  unatt     []  w/US     []  w/ice    []  w/heat    min []  Mechanical Traction: type/lbs                   []  pro   []  sup   []  int   []  cont    []  before manual    []  after manual    min []  Ultrasound, settings/location:      min []  Iontophoresis w/ dexamethasone, location:                                               []  take home patch-6hour remove at        []  in clinic   10 min []  Ice     [x]  Heat    location/position: Supine with wedge    min []  Vasopneumatic Device, press/temp:     min []  Other:    [x] Skin assessment post-treatment (if applicable):    [x]  intact    []  redness- no adverse reaction     []redness  adverse reaction:      15 min Therapeutic Exercise:  [x]  See flow sheet   Rationale:      increase ROM and increase strength to improve the patients ability to amb and perform ADLS with increased ease      25 min Therapeutic Activity: Balance activities, amb, stepups   Rationale:   Decrease fall risk    min Patient Education: YES  Reviewed HEP   []  Progressed/Changed HEP based on:   Cont HEP     Other Objective/Functional Measures:    Challenged with balance during tandem amb, required CGA with suzan for balance and sequencing  Reports increased ease with balance while carrying 5# during HKs amb       Post Treatment Pain Level (on 0 to 10) scale:   1  / 10     ASSESSMENT    Assessment/Changes in Function:     Continues to demo decreased balance      []  See Progress Note/Recertification   Patient will continue to benefit from skilled PT services to analyze, cue, progress, modify,, demonstrate, instruct, and address, movement patterns, therapeutic interventions, postural abnormalities, soft tissue restrictions, ROM, strength, functional mobility, body mechanics/ergonomics, and home and community integration, to attain remaining goals.    Progress toward goals / Updated goals:    Challenged balance today for goal 3     PLAN    []  Upgrade activities as tolerated YES Continue plan of care   []  Discharge due to :    []  Other:      Therapist: Jhonatan Palmer DPT    Date: 10/1/2020 Time: 10:30 AM     Future Appointments   Date Time Provider Napoleon Ahmadi   10/6/2020  9:30 AM Ben Reed [facility]   10/8/2020 10:15 AM Oly Cruz, PT [facility]   10/13/2020 11:00 AM Oly Cruz, PT [facility]   10/15/2020  9:30 AM Tariq Barnard, ANA PAULA [facility]   10/20/2020  9:30 AM Oly Cruz PT [facility]   10/22/2020  9:30 AM Tariq Barnard, PTA [facility]   10/27/2020  9:30 AM Tariq Barnard, PTA [facility]   10/29/2020 10:15 AM Oly Cruz, PT [facility]

## 2020-10-06 ENCOUNTER — HOSPITAL ENCOUNTER (OUTPATIENT)
Dept: PHYSICAL THERAPY | Age: 80
Discharge: HOME | End: 2020-10-06
Payer: MEDICARE

## 2020-10-06 PROCEDURE — 97530 THERAPEUTIC ACTIVITIES: CPT

## 2020-10-06 PROCEDURE — 97110 THERAPEUTIC EXERCISES: CPT

## 2020-10-06 NOTE — PROGRESS NOTES
PHYSICAL THERAPY - DAILY TREATMENT NOTE    Patient Name: Mykel Goodman        Date: 10/6/2020  : 1940   yes Patient  Verified  Visit #:   15   of   22  Insurance: Payor: Dinh Parker / Plan: [place] MEDICARE PART A & B / Product Type: Medicare /      In time: 930 Out time: 3869   Total Treatment Time: 63     Medicare/BCBS Time Tracking (below)   Total Timed Codes (min):   53 1:1 Treatment Time:  48     TREATMENT AREA =  Low back pain [M54.5]  Status post lumbar spine operation [Z98.890]    SUBJECTIVE  Pain Level (on 0 to 10 scale):   0  / 10   Medication Changes/New allergies or changes in medical history, any new surgeries or procedures?    no  If yes, update Summary List   Subjective Functional Status/Changes:  []  No changes reported     \"I been ok, its not hurting today.  I did some HEP this weekend\"         OBJECTIVE  Modalities Rationale:     decrease inflammation and decrease pain to improve patient's ability to safely perform ADLs/ bending/stooping/ lifting/prolong sitting, stding and amb/ stairs with minimal to no pain     min [] Estim, type/location:                                      []  att     []  unatt     []  w/US     []  w/ice    []  w/heat    min []  Mechanical Traction: type/lbs                   []  pro   []  sup   []  int   []  cont    []  before manual    []  after manual    min []  Ultrasound, settings/location:      min []  Iontophoresis w/ dexamethasone, location:                                               []  take home patch       []  in clinic   10 min [x]  Ice     []  Heat    location/position: prone under hips with wedge under B LEs    min []  Vasopneumatic Device, press/temp:     min []  Other:    [x] Skin assessment post-treatment (if applicable):    [x]  intact    []  redness- no adverse reaction     []redness  adverse reaction:         23 min Therapeutic Exercise:  [x]  See flow sheet   Rationale:      increase ROM, increase strength, improve coordination, improve balance and increase proprioception to improve the patients ability to safely perform ADLs/ bending/stooping/ lifting/prolong sitting, stding and amb/ stairs with minimal to no pain       30 min Therapeutic Activity: [x]  See flow sheet  postural training  HRs for push off and TRs for ankle HS during gait  HK amb with 5#  grape vine  Tandem amb  suitcase carry    Rationale:    increase ROM, increase strength, improve coordination, improve balance and increase proprioception to improve the patients ability to safely perform ADLs/ bending/stooping/ lifting/prolong sitting, stding and amb/ stairs with minimal to no pain      Billed With/As:   [x] TE   [x] TA   [] Neuro   [] Self Care Patient Education: [x] Review HEP    [] Progressed/Changed HEP based on:   [x] positioning   [x] body mechanics   [x] transfers   [x] heat/ice application    [x] other: Pt ed on importance and benefits of compliance with HEP, core strength/stability and proper posture; pt verbalized understanding     Other Objective/Functional Measures:    VCs + demo to perform proper technique for TE  initiated AE for MIP without UE without LOB  SBA for HK x 60' with 5#, tandem x 80' ea, and grapevine x 30'  supervision for suit case carry x 61' with 5#  SLS x 30\" without LOB with 1 finger support    Post Treatment Pain Level (on 0 to 10) scale:   0  / 10     ASSESSMENT  Assessment/Changes in Function:   Progressed there-ex without c/o increase p!  requires 1 finger for SLS      []  See Progress Note/Recertification   Patient will continue to benefit from skilled PT services to modify and progress therapeutic interventions, address functional mobility deficits, address ROM deficits, address strength deficits, analyze and address soft tissue restrictions, analyze and cue movement patterns, analyze and modify body mechanics/ergonomics, assess and modify postural abnormalities and instruct in home and community integration to attain remaining goals. Progress toward goals / Updated goals:  · Goals for this certification period include and are to be achieved in   4  weeks: 1. Patient to be Safe and Independent with HEP to self-manage/prevent symptoms after DC  2. Patient to demonstrate five times sit <-->stand score to < 14\" to indicate increased independence and safety in community. 3. Pt to report no difficulty showering (I).       PLAN  [x]  Upgrade activities as tolerated yes Continue plan of care   []  Discharge due to :    []  Other:      Therapist: Den Muir PTA    Date: 10/6/2020 Time: 11:18 AM     Future Appointments   Date Time Provider Napoleon Ahmadi   10/6/2020  9:30 AM Bhumi Boo [facility]   10/8/2020 10:15 AM Agnieszka Door, PT [facility]   10/13/2020 11:00 AM Agnieszka Door, PT [facility]   10/15/2020  9:30 AM Ashkan Daria, PTA [facility]   10/20/2020  9:30 AM Agnieszka Door, PT [facility]   10/22/2020  9:30 AM Ashkan Daria, PTA [facility]   10/27/2020  9:30 AM Ashkan Daria, PTA [facility]   10/29/2020 10:15 AM Agnieszka Door, PT [facility]

## 2020-10-08 ENCOUNTER — HOSPITAL ENCOUNTER (OUTPATIENT)
Dept: PHYSICAL THERAPY | Age: 80
Discharge: HOME | End: 2020-10-08
Payer: MEDICARE

## 2020-10-08 PROCEDURE — 97110 THERAPEUTIC EXERCISES: CPT

## 2020-10-08 PROCEDURE — 97530 THERAPEUTIC ACTIVITIES: CPT

## 2020-10-08 NOTE — PROGRESS NOTES
PHYSICAL THERAPY - DAILY TREATMENT NOTE    Patient Name: Iván Sosa        Date: 10/8/2020  : 1940   YES Patient  Verified  Visit #:     Insurance: Payor: Staci Sanchez / Plan: [place] MEDICARE PART A & B / Product Type: Medicare /      In time: 9:50early Out time: 10:53   Total Treatment Time: 63     Medicare/BCBS Time Tracking (below)   Total Timed Codes (min):  63 1:1 Treatment Time:  53     TREATMENT AREA = Low back pain [M54.5]  Status post lumbar spine operation [Z98.890]    SUBJECTIVE    Pain Level (on 0 to 10 scale):  \"sore\"  / 10   Medication Changes/New allergies or changes in medical history, any new surgeries or procedures? NO    If yes, update Summary List   Subjective Functional Status/Changes:  []  No changes reported     \"Back is fine, the hips are a little sore from the (hip 3-way). \"          OBJECTIVE     Therapeutic Procedures:  Min Procedure Specifics + Rationale   n/a [x]  Patient Education (performed throughout session) [x] Review HEP    [] Progressed/Changed HEP based on:   [] proper performance and advancement of Therex/TA   [] reduction in pain level    [] increased functional capacity       [] change in directional preference   38(38) [x] Therapeutic Exercise   [x]  See Flowsheet   Rationale: increase ROM and increase strength to improve the patients ability to participate in ADL's    15(15) [x] Therapeutic Activity   [x]  See Flowsheet  Rationale:  To improve safety, proprioception, coordination, and efficiency with tasks     Modality rationale: decrease inflammation, decrease pain, increase tissue extensibility and increase muscle contraction/control to improve the patients ability to perform ADL's with greater ease       Min Type Additional Details   10 [x]  Heat         [] pre-FABIAN      [x] post-FABIAN Location:L/S    [x] supine              [] prone     [x] legs elevated    [] legs flat   [] sitting   [x] Skin assessment post-treatment:  [x]intact [x]redness- no adverse reaction       []redness  adverse reaction:     Other Objective/Functional Measures:    Increased reps/sets/resistance per flow sheet. Post Treatment Pain Level (on 0 to 10) scale:   0  / 10     ASSESSMENT    Assessment/Changes in Function:       Tolerated treatment well without complaints of progression, indicating improved functional capacity for ADL's. Patient will continue to benefit from skilled PT services to modify and progress therapeutic interventions, address functional mobility deficits, address ROM deficits, address strength deficits, analyze and address soft tissue restrictions, analyze and cue movement patterns, analyze and modify body mechanics/ergonomics and instruct in home and community integration  to attain remaining goals   Progress toward goals / Updated goals:    Compliant towards HEP     PLAN    [x]  Upgrade activities as tolerated  [x]  Update interventions per flow sheet YES Continue plan of care   []  Discharge due to :    []  Other:      Therapist: Jay Chicas \"BJ\" JAN Myers, Cert. MDT, Cert. DN, Cert. SMT, Dip.  Osteopractic    Date: 10/8/2020 Time: 10:00 AM     Future Appointments   Date Time Provider Napoleon Ahmadi   10/8/2020 10:15 AM George Pang, PT [facility]   10/13/2020 11:00 AM George Pang, PT [facility]   10/15/2020  9:30 AM Irma Muñiz PTA [facility]   10/20/2020  9:30 AM George Pang PT [facility]   10/22/2020  9:30 AM Irma Muñiz PTA [facility]   10/27/2020  9:30 AM ANA PAULA Aguirre [facility]   10/29/2020 10:15 AM George Pang PT [facility]

## 2020-10-13 ENCOUNTER — HOSPITAL ENCOUNTER (OUTPATIENT)
Dept: PHYSICAL THERAPY | Age: 80
Discharge: HOME | End: 2020-10-13
Payer: MEDICARE

## 2020-10-13 PROCEDURE — 97110 THERAPEUTIC EXERCISES: CPT

## 2020-10-13 PROCEDURE — 97112 NEUROMUSCULAR REEDUCATION: CPT

## 2020-10-13 PROCEDURE — 97530 THERAPEUTIC ACTIVITIES: CPT

## 2020-10-13 NOTE — PROGRESS NOTES
PHYSICAL THERAPY - DAILY TREATMENT NOTE    Patient Name: Gen Eldridge        Date: 10/13/2020  : 1940   YES Patient  Verified  Visit #:   15   of   22  Insurance: Payor: Heena Das / Plan: [place] MEDICARE PART A & B / Product Type: Medicare /      In time: 10:43early Out time: 11:46   Total Treatment Time: 63     Medicare/BCBS Time Tracking (below)   Total Timed Codes (min):  63 1:1 Treatment Time:  53     TREATMENT AREA = Low back pain [M54.5]  Status post lumbar spine operation [Z98.890]    SUBJECTIVE    Pain Level (on 0 to 10 scale):  0  / 10   Medication Changes/New allergies or changes in medical history, any new surgeries or procedures? NO    If yes, update Summary List   Subjective Functional Status/Changes:  []  No changes reported     \"I'm doing well. Just the balance stuff gets me. \"          OBJECTIVE     Therapeutic Procedures:  Min Procedure Specifics + Rationale   n/a [x]  Patient Education (performed throughout session) [x] Review HEP    [] Progressed/Changed HEP based on:   [] proper performance and advancement of Therex/TA   [] reduction in pain level    [] increased functional capacity       [] change in directional preference   25(25) [x] Therapeutic Exercise   [x]  See Flowsheet   Rationale: increase ROM and increase strength to improve the patients ability to participate in ADL's    15(15) [x] Therapeutic Activity   [x]  See Flowsheet  Agility Ladder  Rationale:  To improve safety, proprioception, coordination, and efficiency with tasks   15(15) [x] Neuromuscular Re-ed   [x]  See Flowsheet  MSR EO/EC on flat/AE  Rationale: increase ROM, increase strength, improve coordination, improve balance and increase proprioception  to improve the patients ability to safely participate in ADL's     Modality rationale: decrease inflammation, decrease pain, increase tissue extensibility and increase muscle contraction/control to improve the patients ability to perform ADL's with greater ease       Min Type Additional Details   10 [x]  Heat         [] pre-FABIAN      [x] post-FABIAN Location:L/S    [] supine              [] prone     [] legs elevated    [] legs flat   [] sitting   [x] Skin assessment post-treatment:  [x]intact [x]redness- no adverse reaction       []redness  adverse reaction:     Other Objective/Functional Measures:    Initiated agility ladder. Requires Max VC's and CCG due to poor spatial awareness and coordingation     Post Treatment Pain Level (on 0 to 10) scale:   0  / 10     ASSESSMENT    Assessment/Changes in Function:     Per above, balance in dynamic movement requires more work         Patient will continue to benefit from skilled PT services to modify and progress therapeutic interventions, address functional mobility deficits, address ROM deficits, address strength deficits, analyze and address soft tissue restrictions, analyze and cue movement patterns, analyze and modify body mechanics/ergonomics, assess and modify postural abnormalities, address imbalance/dizziness and instruct in home and community integration  to attain remaining goals   Progress toward goals / Updated goals:    Improving in strength     PLAN    [x]  Upgrade activities as tolerated  [x]  Update interventions per flow sheet YES Continue plan of care   []  Discharge due to :    []  Other:      Therapist: Loida Redding \"BJ\" Holger Sahu, DPT, Cert. MDT, Cert. DN, Cert. SMT, Dip.  Osteopractic    Date: 10/13/2020 Time: 11:16 AM     Future Appointments   Date Time Provider Napoleon Ahmadi   10/15/2020  9:30 AM Demetrio Shin [facility]   10/20/2020  9:30 AM Maciel Iglesias, PT [facility]   10/22/2020  9:30 AM Patrick Lewis PTA [facility]   10/27/2020  9:30 AM Patrick Lewis PTA [facility]   10/29/2020 10:15 AM Maciel Iglesias, VINEET ROLAND [facility]

## 2020-10-15 ENCOUNTER — HOSPITAL ENCOUNTER (OUTPATIENT)
Dept: PHYSICAL THERAPY | Age: 80
Discharge: HOME | End: 2020-10-15
Payer: MEDICARE

## 2020-10-15 PROCEDURE — 97110 THERAPEUTIC EXERCISES: CPT

## 2020-10-15 PROCEDURE — 97530 THERAPEUTIC ACTIVITIES: CPT

## 2020-10-15 NOTE — PROGRESS NOTES
PHYSICAL THERAPY - DAILY TREATMENT NOTE    Patient Name: Jazmine Flanagan        Date: 10/15/2020  : 1940   yes Patient  Verified  Visit #:     Insurance: Payor: Sophie Johnson / Plan: [place] MEDICARE PART A & B / Product Type: Medicare /      In time:  930 Out time: 4742   Total Treatment Time: 62     Medicare/BCBS Time Tracking (below)   Total Timed Codes (min):   52 1:1 Treatment Time: 52     TREATMENT AREA =  Low back pain [M54.5]  Status post lumbar spine operation [Z98.890]    SUBJECTIVE  Pain Level (on 0 to 10 scale):   0  / 10   Medication Changes/New allergies or changes in medical history, any new surgeries or procedures?    no  If yes, update Summary List   Subjective Functional Status/Changes:  []  No changes reported     \"I am getting stronger, I need to learn how to lift\"\"         OBJECTIVE  Modalities Rationale:     decrease inflammation and decrease pain to improve patient's ability to safely perform ADLs/ bending/stooping/ lifting/prolong sitting, stding and amb/ stairs with minimal to no pain     min [] Estim, type/location:                                      []  att     []  unatt     []  w/US     []  w/ice    []  w/heat    min []  Mechanical Traction: type/lbs                   []  pro   []  sup   []  int   []  cont    []  before manual    []  after manual    min []  Ultrasound, settings/location:      min []  Iontophoresis w/ dexamethasone, location:                                               []  take home patch       []  in clinic   10 min []  Ice     [x]  Heat    location/position: supine with wedge under B LEs    min []  Vasopneumatic Device, press/temp:     min []  Other:    [x] Skin assessment post-treatment (if applicable):    [x]  intact    []  redness- no adverse reaction     []redness  adverse reaction:        22 min Therapeutic Exercise:  [x]  See flow sheet   Rationale:      increase ROM, increase strength, improve coordination, improve balance and increase proprioception to improve the patients ability to safely perform ADLs/ bending/stooping/ lifting/prolong sitting, stding and amb/ stairs with minimal to no pain       30 min Therapeutic Activity: [x]  See flow sheet  postural training  HRs for push off and TRs for ankle HS during gait  HK amb with 5#  grape vine  Tandem amb  suitcase carry   lifting  cone taps    Rationale:    increase ROM, increase strength, improve coordination, improve balance and increase proprioception to improve the patients ability to safely perform ADLs/ bending/stooping/ lifting/prolong sitting, stding and amb/ stairs with minimal to no pain      Billed With/As:   [x] TE   [x] TA   [] Neuro   [] Self Care Patient Education: [x] Review HEP    [] Progressed/Changed HEP based on:   [x] positioning   [x] body mechanics   [x] transfers   [x] heat/ice application    [x] other: Pt ed on importance and benefits of compliance with HEP, core strength/stability and proper posture; pt verbalized understanding     Other Objective/Functional Measures:    VCs + demo to perform proper technique for TE  initiated cone taps, lateral step ups and lifting technique without c/o p!   SL cone taps without UE assist, 1 LOB on Right SLS with (I) to recover with //    demos proper lifting tech with 10# from 8'' step <>chest without producing pain   reviewed and performed proper mini sit- up per pt's request   Post Treatment Pain Level (on 0 to 10) scale:   0 / 10     ASSESSMENT  Assessment/Changes in Function:   Progressed there-ex without c/o increase p!  progressing towards balance with SLS and SL TE to decrease risk for falls  demos proper lifting techniques   []  See Progress Note/Recertification   Patient will continue to benefit from skilled PT services to modify and progress therapeutic interventions, address functional mobility deficits, address ROM deficits, address strength deficits, analyze and address soft tissue restrictions, analyze and cue movement patterns, analyze and modify body mechanics/ergonomics, assess and modify postural abnormalities and instruct in home and community integration to attain remaining goals. Progress toward goals / Updated goals:  · Goals for this certification period include and are to be achieved in   4  weeks: 1. Patient to be Safe and Independent with HEP to self-manage/prevent symptoms after DC  2. Patient to demonstrate five times sit <-->stand score to < 14\" to indicate increased independence and safety in community. 3. Pt to report no difficulty showering (I).       PLAN  [x]  Upgrade activities as tolerated yes Continue plan of care   []  Discharge due to :    []  Other:      Therapist: Elizabeth Jeffires PTA    Date: 10/15/2020 Time: 7:55 AM     Future Appointments   Date Time Provider Napoleon Ahmadi   10/15/2020  9:30 AM Sharron High [facility]   10/20/2020  9:30 AM Ar High PT [facility]   10/22/2020  9:30 AM Emily Terry PTA [facility]   10/27/2020  9:30 AM Emily Terry PTA [facility]   10/29/2020 10:15 AM VINEET AbramsPTLISET [facility]

## 2020-10-20 ENCOUNTER — HOSPITAL ENCOUNTER (OUTPATIENT)
Dept: PHYSICAL THERAPY | Age: 80
Discharge: HOME | End: 2020-10-20
Payer: MEDICARE

## 2020-10-20 PROCEDURE — 97110 THERAPEUTIC EXERCISES: CPT

## 2020-10-20 PROCEDURE — 97530 THERAPEUTIC ACTIVITIES: CPT

## 2020-10-20 NOTE — PROGRESS NOTES
PHYSICAL THERAPY - DAILY TREATMENT NOTE    Patient Name: Nayeli Ho        Date: 10/20/2020  : 1940   YES Patient  Verified  Visit #:     Insurance: Payor: Cristhian Mason / Plan: [place] MEDICARE PART A & B / Product Type: Medicare /      In time: 9:17early Out time: 10:20   Total Treatment Time: 63     Medicare/BCBS Time Tracking (below)   Total Timed Codes (min):  63 1:1 Treatment Time:  53     TREATMENT AREA = Low back pain [M54.5]  Status post lumbar spine operation [Z98.890]    SUBJECTIVE    Pain Level (on 0 to 10 scale):  0  / 10   Medication Changes/New allergies or changes in medical history, any new surgeries or procedures? NO    If yes, update Summary List   Subjective Functional Status/Changes:  []  No changes reported     \"I've been working on my balance at home. \"          OBJECTIVE     Therapeutic Procedures:  Min Procedure Specifics + Rationale   n/a [x]  Patient Education (performed throughout session) [x] Review HEP    [] Progressed/Changed HEP based on:   [] proper performance and advancement of Therex/TA   [] reduction in pain level    [] increased functional capacity       [] change in directional preference   23(23) [x] Therapeutic Exercise   [x]  See Flowsheet   Rationale: increase ROM and increase strength to improve the patients ability to participate in ADL's    30(30) [x] Therapeutic Activity   [x]  See Flowsheet  Rationale:  To improve safety, proprioception, coordination, and efficiency with tasks     Modality rationale: decrease inflammation, decrease pain, increase tissue extensibility and increase muscle contraction/control to improve the patients ability to perform ADL's with greater ease       Min Type Additional Details   10 [x]  Heat         [] pre-FABIAN      [x] post-FABIAN Location::L/S    [] supine              [] prone     [] legs elevated    [] legs flat   [] sitting   [x] Skin assessment post-treatment:  [x]intact [x]redness- no adverse reaction []redness  adverse reaction:     Other Objective/Functional Measures:    No LoB performing tandem walk. Added rack carry with 5# weight. Post Treatment Pain Level (on 0 to 10) scale:   0  / 10     ASSESSMENT    Assessment/Changes in Function:       Significant improvement in performing ambulation therex         Patient will continue to benefit from skilled PT services to modify and progress therapeutic interventions, address functional mobility deficits, address ROM deficits, address strength deficits, analyze and address soft tissue restrictions, analyze and cue movement patterns and instruct in home and community integration  to attain remaining goals   Progress toward goals / Updated goals:    Progressing well in balance and stability for carrying groceries. PLAN    [x]  Upgrade activities as tolerated  [x]  Update interventions per flow sheet YES Continue plan of care   []  Discharge due to :    []  Other:      Therapist: Joao Mckeon \"BJ\" JAN Myers, Cert. MDT, Cert. DN, Cert. SMT, Dip.  Osteopractic    Date: 10/20/2020 Time: 9:21 AM     Future Appointments   Date Time Provider Napoleon Ahmadi   10/20/2020  9:30 AM Joey Angel PT [facility]   10/22/2020  9:30 AM Lester Hdz PTA [facility]   10/27/2020  9:30 AM Lester Hdz PTA [facility]   10/29/2020 10:15 AM VINEET Curry [facility]

## 2020-10-22 ENCOUNTER — HOSPITAL ENCOUNTER (OUTPATIENT)
Dept: PHYSICAL THERAPY | Age: 80
Discharge: HOME | End: 2020-10-22
Payer: MEDICARE

## 2020-10-22 PROCEDURE — 97110 THERAPEUTIC EXERCISES: CPT

## 2020-10-22 PROCEDURE — 97530 THERAPEUTIC ACTIVITIES: CPT

## 2020-10-22 NOTE — PROGRESS NOTES
PHYSICAL THERAPY - DAILY TREATMENT NOTE    Patient Name: Jayashree Azul        Date: 10/22/2020  : 1940   yes Patient  Verified  Visit #:     Insurance: Payor: Emelyn Bowers / Plan: [place] MEDICARE PART A & B / Product Type: Medicare /      In time:  930 Out time: 3813   Total Treatment Time: 70     Medicare/BCBS Time Tracking (below)   Total Timed Codes (min):   60 1:1 Treatment Time: 52     TREATMENT AREA =  Low back pain [M54.5]  Status post lumbar spine operation [Z98.890]    SUBJECTIVE  Pain Level (on 0 to 10 scale):   0  / 10   Medication Changes/New allergies or changes in medical history, any new surgeries or procedures?    no  If yes, update Summary List   Subjective Functional Status/Changes:  []  No changes reported     \"Its ok when I do stairs, I do not have any problems walking either\"\"         OBJECTIVE  Modalities Rationale:     decrease inflammation and decrease pain to improve patient's ability to safely perform ADLs/ bending/stooping/ lifting/prolong sitting, stding and amb/ stairs with minimal to no pain     min [] Estim, type/location:                                      []  att     []  unatt     []  w/US     []  w/ice    []  w/heat    min []  Mechanical Traction: type/lbs                   []  pro   []  sup   []  int   []  cont    []  before manual    []  after manual    min []  Ultrasound, settings/location:      min []  Iontophoresis w/ dexamethasone, location:                                               []  take home patch       []  in clinic   10 min []  Ice     [x]  Heat    location/position: supine with wedge under B LEs    min []  Vasopneumatic Device, press/temp:     min []  Other:    [x] Skin assessment post-treatment (if applicable):    [x]  intact    []  redness- no adverse reaction     []redness  adverse reaction:        30 min Therapeutic Exercise:  [x]  See flow sheet   Rationale:      increase ROM, increase strength, improve coordination, improve balance and increase proprioception to improve the patients ability to safely perform ADLs/ bending/stooping/ lifting/prolong sitting, stding and amb/ stairs with minimal to no pain       30 min Therapeutic Activity: [x]  See flow sheet  postural training  HRs for push off and TRs for ankle HS during gait  HK amb with 5#  agility ladder  lifting 5#  cone taps    Rationale:    increase ROM, increase strength, improve coordination, improve balance and increase proprioception to improve the patients ability to safely perform ADLs/ bending/stooping/ lifting/prolong sitting, stding and amb/ stairs with minimal to no pain      Billed With/As:   [x] TE   [x] TA   [] Neuro   [] Self Care Patient Education: [x] Review HEP    [] Progressed/Changed HEP based on:   [x] positioning   [x] body mechanics   [x] transfers   [x] heat/ice application    [x] other: Pt ed on importance and benefits of compliance with HEP, core strength/stability and proper posture; pt verbalized understanding     Other Objective/Functional Measures:    VCs + demo to perform proper technique for TE  initiated agility ladder and step downs without c/o p!   B UE assist with step downs on 6\"  SLS x 30'' without LOB  agility ladder: 2 in/out fwd/lateral; diagonal- note pt with significant difficulty performing instructions for proper sequence     Post Treatment Pain Level (on 0 to 10) scale:   0 / 10     ASSESSMENT  Assessment/Changes in Function:   Progressed there-ex without c/o increase p!  requires 1 finger support at times with SLS    []  See Progress Note/Recertification   Patient will continue to benefit from skilled PT services to modify and progress therapeutic interventions, address functional mobility deficits, address ROM deficits, address strength deficits, analyze and address soft tissue restrictions, analyze and cue movement patterns, analyze and modify body mechanics/ergonomics, assess and modify postural abnormalities and instruct in home and community integration to attain remaining goals. Progress toward goals / Updated goals:  · Goals for this certification period include and are to be achieved in   4  weeks: 1. Patient to be Safe and Independent with HEP to self-manage/prevent symptoms after DC  2. Patient to demonstrate five times sit <-->stand score to < 14\" to indicate increased independence and safety in community. 3. Pt to report no difficulty showering (I).       PLAN  [x]  Upgrade activities as tolerated yes Continue plan of care   []  Discharge due to :    []  Other:      Therapist: Keri Moran PTA    Date: 10/22/2020 Time: 11:00 AM     Future Appointments   Date Time Provider Napoleon Ahmadi   10/27/2020  9:30 AM Didier Mark [facility]   10/29/2020 10:15 AM Kevin Diehl, VINEET [facility]

## 2020-10-27 ENCOUNTER — HOSPITAL ENCOUNTER (OUTPATIENT)
Dept: PHYSICAL THERAPY | Age: 80
Discharge: HOME | End: 2020-10-27
Payer: MEDICARE

## 2020-10-27 PROCEDURE — 97530 THERAPEUTIC ACTIVITIES: CPT

## 2020-10-27 PROCEDURE — 97110 THERAPEUTIC EXERCISES: CPT

## 2020-10-27 NOTE — PROGRESS NOTES
[address] [facility] PHYSICAL THERAPY  [address], [address] - Phone: [phone]  Fax: [phone] OF CARE/RECERTIFICATION FOR PHYSICAL THERAPY          Patient Name: Gen Eldridge : 1940   Treatment/Medical Diagnosis: Low back pain [M54.5]  Status post lumbar spine operation [Z98.890]   Onset Date: 2020    Referral Source: Nilay Marroquin MD [facility]): 2020   Prior Hospitalization: See Medical History Provider #: [id number]   Prior Level of Function: Functionally (I)    Comorbidities: Hx prior L/S sx, Thyroid, OA, Dizziness   Medications: Verified on Patient Summary List   Visits from [facility]: 19 Missed Visits: 0     Goal/Measure of Progress Goal Met? 1. Patient to be Safe and Independent with HEP to self-manage/prevent symptoms after DC. Status at last Eval: compliant with HEP Current Status: complaint with HEP progressing   2. Patient to demonstrate five times sit <-->stand score to < 14\" to indicate increased independence and safety in community. Status at last Eval: 18 '\" Current Status: 9\"' yes   3. Pt to report no difficulty showering (I). Status at last Eval: mod difficulty  Current Status: no difficulty  yes     Key Functional Changes/Progress: Gen Eldridge continues to progress well towards goals in PT for s/p L/s discectomy as evidence by the following measurements and info. Pt is able to perform all TE in clinic without producing pain. Pt reports 75% overall improvement since [facility]. Pt demos SLS x 28\" on LLE, and 13\"> 15\" on RLE; will progress on RLE. Pt is able to safely and (I) take a shower, clean dishes, and prepare a meal without difficulty. Pt performed sit<>stand test x 5 reps x 9'', indicating decrease fall risk as well. Pt reports mod difficulty bending to clean and bending to perform tasks around the house.   Pt demos proper, pain free lifting tech with 5# from knees<>Chest<>pivot <>floor. Problem List: pain affecting function, decrease ROM, decrease strength, edema affecting function, impaired gait/ balance, decrease ADL/ functional abilitiies, decrease activity tolerance, decrease flexibility/ joint mobility and decrease transfer abilities   Treatment Plan may include any combination of the following: Therapeutic exercise, Therapeutic activities, Neuromuscular re-education, Physical agent/modality, Gait/balance training, Manual therapy, Aquatic therapy, Patient education, Self Care training, Functional mobility training, Home safety training and Stair training  Patient Goal(s) has been updated and includes:      Goals for this certification period include and are to be achieved in   4  weeks: 1. Patient to be Safe and Independent with HEP to self-manage/prevent symptoms after DC  2. Patient to demonstrate five times 10# lifting tech from floor<>chest  to promote (I) with lifting at home. Frequency / Duration:   Patient to be seen   2-3   times per week for   4    weeks:    Assessments/Recommendations: Pt will benefit from further skilled PT services to increase strength/stability and decrease sxs to promote functional mobility. If you have any questions/comments please contact us directly at [phone]. Thank you for allowing us to assist in the care of your patient. Therapist Signature: Connie Adan, ANA PAULA Retana \"BJ\" Marc Juan, JAN, Cert. MDT, Cert. DN, Cert. SMT, Dip. Osteopractic Date: 73/41/3899   Certification Period:  Reporting Period: 8/24/2020-11/23/2020 8/24/2020-9/24/2020 Time: 10:40 AM   NOTE TO PHYSICIAN:  PLEASE COMPLETE THE ORDERS BELOW AND FAX TO   [facility]: [phone].   If you are unable to process this request in 24 hours please contact our office: [phone].    ___ I have read the above report and request that my patient continue as recommended.   ___ I have read the above report and request that my patient continue therapy with the following changes/special instructions: ________________________________________________   ___ I have read the above report and request that my patient be discharged from therapy.      Physician Signature:        Date:       Time:

## 2020-10-27 NOTE — PROGRESS NOTES
PHYSICAL THERAPY - DAILY TREATMENT NOTE    Patient Name: Melia Rollins        Date: 10/27/2020  : 1940   yes Patient  Verified  Visit #:     Insurance: Payor: Christal Dye / Plan: [place] MEDICARE PART A & B / Product Type: Medicare /      In time:  316 Out time: 3514   Total Treatment Time: 56     Medicare/[facility] Time Tracking (below)   Total Timed Codes (min):    46 1:1 Treatment Time: 46     TREATMENT AREA =  Low back pain [M54.5]  Status post lumbar spine operation [Z98.890]    SUBJECTIVE  Pain Level (on 0 to 10 scale):   0  / 10   Medication Changes/New allergies or changes in medical history, any new surgeries or procedures?    no  If yes, update Summary List   Subjective Functional Status/Changes:  []  No changes reported     \"I am not bending as good as I want to, but I am not sure if I should or not\"    Pt reports 75% better overall, and no difficulty showering at this time  Pt reports most difficulty is bending      OBJECTIVE  Modalities Rationale:     decrease inflammation and decrease pain to improve patient's ability to safely perform ADLs/ bending/stooping/ lifting/prolong sitting, stding and amb/ stairs with minimal to no pain     min [] Estim, type/location:                                      []  att     []  unatt     []  w/US     []  w/ice    []  w/heat    min []  Mechanical Traction: type/lbs                   []  pro   []  sup   []  int   []  cont    []  before manual    []  after manual    min []  Ultrasound, settings/location:      min []  Iontophoresis w/ dexamethasone, location:                                               []  take home patch       []  in clinic   10 min []  Ice     [x]  Heat    location/position: supine with wedge under B LEs    min []  Vasopneumatic Device, press/temp:     min []  Other:    [x] Skin assessment post-treatment (if applicable):    [x]  intact    []  redness- no adverse reaction     []redness  adverse reaction:         19 min Therapeutic Exercise:  [x]  See flow sheet   Rationale:      increase ROM, increase strength, improve coordination, improve balance and increase proprioception to improve the patients ability to safely perform ADLs/ bending/stooping/ lifting/prolong sitting, stding and amb/ stairs with minimal to no pain        27 min Therapeutic Activity: [x]  See flow sheet  postural training  HRs for push off and TRs for ankle HS during gait  farmers carry 5#/10#  lifting 5#  SLS   stair negotiation    Rationale:    increase ROM, increase strength, improve coordination, improve balance and increase proprioception to improve the patients ability to safely perform ADLs/ bending/stooping/ lifting/prolong sitting, stding and amb/ stairs with minimal to no pain      Billed With/As:   [x] TE   [x] TA   [] Neuro   [] Self Care Patient Education: [x] Review HEP    [] Progressed/Changed HEP based on:   [x] positioning   [x] body mechanics   [x] transfers   [x] heat/ice application    [x] other: Pt ed on importance and benefits of compliance with HEP, core strength/stability and proper posture; pt verbalized understanding     Other Objective/Functional Measures:    VCs + demo to perform proper technique for TE  sit <>std test: 9 '', 9'', 9\"  SLS x 28'' x 2 left ; 15\" + 13\" on right  (I) negotiating up and down 3 steps x 4  with 1 HandRail safely with reciprocal pattern   demos proper 5 # lifting from knees<>chest<>povit<>floor with mild pain    Post Treatment Pain Level (on 0 to 10) scale:   0 / 10     ASSESSMENT  Assessment/Changes in Function:   See PN     []  See Progress Note/Recertification   Patient will continue to benefit from skilled PT services to modify and progress therapeutic interventions, address functional mobility deficits, address ROM deficits, address strength deficits, analyze and address soft tissue restrictions, analyze and cue movement patterns, analyze and modify body mechanics/ergonomics, assess and modify postural abnormalities and instruct in home and community integration to attain remaining goals. Progress toward goals / Updated goals:  · Goals for this certification period include and are to be achieved in   4  weeks: 1. Patient to be Safe and Independent with HEP to self-manage/prevent symptoms after DC  2. Patient to demonstrate five times sit <-->stand score to < 14\" to indicate increased independence and safety in community. MET- 9\"  3. Pt to report no difficulty showering (I). MET     PLAN  [x]  Upgrade activities as tolerated yes Continue plan of care   []  Discharge due to :    [x]  Other: Pt will benefit from further skilled PT services 2x wk x 2 wks to increase strength/stability and decrease sxs to promote functional mobility.      Therapist: Becky Cano PTA    Date: 10/27/2020 Time: 9:00 AM     Future Appointments   Date Time Provider Napoleon Ahmadi   10/27/2020  9:30 AM Arnol Del Angel [facility]   10/29/2020 10:15 AM Joey Angel PT [facility]

## 2020-10-29 ENCOUNTER — HOSPITAL ENCOUNTER (OUTPATIENT)
Dept: PHYSICAL THERAPY | Age: 80
Discharge: HOME | End: 2020-10-29
Payer: MEDICARE

## 2020-10-29 PROCEDURE — 97530 THERAPEUTIC ACTIVITIES: CPT

## 2020-10-29 PROCEDURE — 97110 THERAPEUTIC EXERCISES: CPT

## 2020-10-29 NOTE — PROGRESS NOTES
PHYSICAL THERAPY - DAILY TREATMENT NOTE    Patient Name: Constance Watson        Date: 10/29/2020  : 1940   YES Patient  Verified  Visit #:     Insurance: Payor: [place] MEDICARE / Plan: [place] MEDICARE PART A & B / Product Type: Medicare /      In time: 9:40 Out time: 10:30   Total Treatment Time: 50     Medicare/BCBS Time Tracking (below)   Total Timed Codes (min):  50 1:1 Treatment Time:  40     TREATMENT AREA = Low back pain [M54.5]  Status post lumbar spine operation [Z98.890]    SUBJECTIVE    Pain Level (on 0 to 10 scale):  0  / 10   Medication Changes/New allergies or changes in medical history, any new surgeries or procedures? NO    If yes, update Summary List   Subjective Functional Status/Changes:  []  No changes reported     \"Just tired today. Not really pain or discomfort. \"          OBJECTIVE     Therapeutic Procedures:  Min Procedure Specifics + Rationale   n/a [x]  Patient Education (performed throughout session) [x] Review HEP    [] Progressed/Changed HEP based on:   [] proper performance and advancement of Therex/TA   [] reduction in pain level    [] increased functional capacity       [] change in directional preference   30(30) [x] Therapeutic Exercise   [x]  See Flowsheet   Rationale: increase ROM and increase strength to improve the patients ability to participate in ADL's    10(10) [x] Therapeutic Activity   [x]  See Flowsheet  Loaded carries  Rationale:  To improve safety, proprioception, coordination, and efficiency with tasks     Modality rationale: decrease inflammation, decrease pain, increase tissue extensibility and increase muscle contraction/control to improve the patients ability to perform ADL's with greater ease       Min Type Additional Details   10 [x]  Heat         [] pre-FABIAN      [x] post-FABIAN Location:L/S    [x] supine              [] prone     [x] legs elevated    [] legs flat   [] sitting   [x] Skin assessment post-treatment:  [x]intact [x]redness- no adverse reaction       []redness  adverse reaction:     Other Objective/Functional Measures:    Increased reps/sets/resistance per flow sheet. Post Treatment Pain Level (on 0 to 10) scale:   0  / 10     ASSESSMENT    Assessment/Changes in Function:       Improved SLS time on right          Patient will continue to benefit from skilled PT services to modify and progress therapeutic interventions, address functional mobility deficits, address ROM deficits, address strength deficits, analyze and address soft tissue restrictions, analyze and cue movement patterns, analyze and modify body mechanics/ergonomics and instruct in home and community integration  to attain remaining goals   Progress toward goals / Updated goals:    1st session since progress note. No significant progress observed       PLAN    [x]  Upgrade activities as tolerated  [x]  Update interventions per flow sheet YES Continue plan of care   []  Discharge due to :    []  Other:      Therapist: Thomas Jung \"BJ\" JAN Myers, Cert. MDT, Cert. DN, Cert. SMT, Dip.  Osteopractic    Date: 10/29/2020 Time: 9:48 AM     Future Appointments   Date Time Provider Napoleon Ahmadi   10/29/2020 10:15 AM Selene Wilder PT [facility]   11/3/2020  9:30 AM Shamar Massey PTA [facility]   11/4/2020 10:15 AM ANA PAULA Veloz

## 2020-11-03 ENCOUNTER — HOSPITAL ENCOUNTER (OUTPATIENT)
Dept: PHYSICAL THERAPY | Age: 80
Discharge: HOME | End: 2020-11-03
Payer: MEDICARE

## 2020-11-03 PROCEDURE — 97110 THERAPEUTIC EXERCISES: CPT

## 2020-11-03 PROCEDURE — 97530 THERAPEUTIC ACTIVITIES: CPT

## 2020-11-03 NOTE — PROGRESS NOTES
PHYSICAL THERAPY - DAILY TREATMENT NOTE    Patient Name: Oracio Mcclure        Date: 11/3/2020  : 1940   yes Patient  Verified  Visit #:     Insurance: Payor: Jose Angelica / Plan: [place] MEDICARE PART A & B / Product Type: Medicare /      In time:  930 Out time: 48   Total Treatment Time: 63     Medicare/BCBS Time Tracking (below)   Total Timed Codes (min):   53 1:1 Treatment Time: 43     TREATMENT AREA =  Low back pain [M54.5]  Status post lumbar spine operation [Z98.890]    SUBJECTIVE  Pain Level (on 0 to 10 scale):   0  / 10   Medication Changes/New allergies or changes in medical history, any new surgeries or procedures?    no  If yes, update Summary List   Subjective Functional Status/Changes:  []  No changes reported     \"I feel good just stiff bc its the morning\"     OBJECTIVE  Modalities Rationale:     decrease inflammation and decrease pain to improve patient's ability to safely perform ADLs/ bending/stooping/ lifting/prolong sitting, stding and amb/ stairs with minimal to no pain     min [] Estim, type/location:                                      []  att     []  unatt     []  w/US     []  w/ice    []  w/heat    min []  Mechanical Traction: type/lbs                   []  pro   []  sup   []  int   []  cont    []  before manual    []  after manual    min []  Ultrasound, settings/location:      min []  Iontophoresis w/ dexamethasone, location:                                               []  take home patch       []  in clinic   10 min []  Ice     [x]  Heat    location/position: supine with wedge under B LEs    min []  Vasopneumatic Device, press/temp:     min []  Other:    [x] Skin assessment post-treatment (if applicable):    [x]  intact    []  redness- no adverse reaction     []redness  adverse reaction:        23 min Therapeutic Exercise:  [x]  See flow sheet   Rationale:      increase ROM, increase strength, improve coordination, improve balance and increase proprioception to improve the patients ability to safely perform ADLs/ bending/stooping/ lifting/prolong sitting, stding and amb/ stairs with minimal to no pain       30 min Therapeutic Activity: [x]  See flow sheet  postural training  HRs for push off and TRs for ankle HS during gait  lifting 10#  SLS   step ups/down/lateral  MIP + turns on AE  TRX mini squats    Rationale:    increase ROM, increase strength, improve coordination, improve balance and increase proprioception to improve the patients ability to safely perform ADLs/ bending/stooping/ lifting/prolong sitting, stding and amb/ stairs with minimal to no pain      Billed With/As:   [x] TE   [x] TA   [] Neuro   [] Self Care Patient Education: [x] Review HEP    [] Progressed/Changed HEP based on:   [x] positioning   [x] body mechanics   [x] transfers   [x] heat/ice application    [x] other: Pt ed on importance and benefits of compliance with HEP, core strength/stability and proper posture; pt verbalized understanding     Other Objective/Functional Measures:    VCs + demo to perform proper technique for TE  initiated MIP on AE + Turns, TRX squats, prone TKE/hip ext and 10# lifting without c/o p!  min UE for turns on AE, no LOB  demos decrease hip ext AROM due to decrease strength   demos proper 10# lifting from 4'' <>chest x 10; no pain noted   discussed with D/C HEP NV  .    Post Treatment Pain Level (on 0 to 10) scale:   0 / 10     ASSESSMENT  Assessment/Changes in Function:   Progressed there-ex without c/o increase p!  increased from 5# to 10# lifting without difficulty   demos proper squat form without producing pain   []  See Progress Note/Recertification   Patient will continue to benefit from skilled PT services to modify and progress therapeutic interventions, address functional mobility deficits, address ROM deficits, address strength deficits, analyze and address soft tissue restrictions, analyze and cue movement patterns, analyze and modify body mechanics/ergonomics, assess and modify postural abnormalities and instruct in home and community integration to attain remaining goals. Progress toward goals / Updated goals:  · Goals for this certification period include and are to be achieved in   4  weeks: 1. Patient to be Safe and Independent with HEP to self-manage/prevent symptoms after DC  2. Patient to demonstrate five times 10# lifting tech from floor<>chest  to promote (I) with lifting at home.   progressing, performed 10# lifting from 4''<>chest x 10 without pain        PLAN  [x]  Upgrade activities as tolerated yes Continue plan of care   []  Discharge due to :    [x]  Other: d/c with HEP NV     Therapist: Rodolfo Rojas PTA    Date: 11/3/2020 Time: 2:00 PM     Future Appointments   Date Time Provider Napoleon Ahmadi   11/4/2020 11:00 AM Megan Higgins, PT [facility]

## 2020-11-04 ENCOUNTER — HOSPITAL ENCOUNTER (OUTPATIENT)
Dept: PHYSICAL THERAPY | Age: 80
Discharge: HOME | End: 2020-11-04
Payer: MEDICARE

## 2020-11-04 PROCEDURE — 97110 THERAPEUTIC EXERCISES: CPT

## 2020-11-04 PROCEDURE — 97530 THERAPEUTIC ACTIVITIES: CPT

## 2020-11-04 NOTE — PROGRESS NOTES
[address]  [facility] PHYSICAL THERAPY  [address] [facility] [address], [address] - Phone: [phone]  Fax: [phone] SUMMARY  Patient Name: Linn John : 1940   Treatment/Medical Diagnosis: Low back pain [M54.5]  Status post lumbar spine operation [Z98.890]   Referral Source: Viktoriya Cardozo MD     Date of Initial Visit: 2020 Attended Visits: 22 Missed Visits: 0     SUMMARY OF TREATMENT  Patient's POC has consisted of therex, therapeutic activities, manual therapy prn, modalities prn, pt. education, and a comprehensive HEP. Treatment strategies used to address functional mobility deficits, ROM deficits, strength deficits, analyze and address soft tissue restrictions, analyze and cue movement patterns, analyze and modify body mechanics/ergonomics, assess and modify postural abnormalities and instruct in home and community integration. CURRENT STATUS  Patient fully independent in performing all therex. She demonstrates good balance, functional mobility, excellent functional strength for her age group, and is pain free in ADL's. Her primary complaint is stiffness in the morning but is able to utilize her HEP to improve her mobility. GOALS/MEASURE OF PROGRESS Goal Met? 1. Patient to be Safe and Independent with HEP to self-manage/prevent symptoms after DC  2. Patient to demonstrate five times 10# lifting tech from floor<>chest  to promote (I) with lifting at home.      MET      MET       RECOMMENDATIONS  Discontinue therapy. Progressing towards or have reached established goals. If you have any questions/comments please contact us directly at [phone]. Thank you for allowing us to assist in the care of your patient. Therapist Signature: Lorenzo Castle \"CHRISTIN\" Dane Adhikari DPT, Cert. MDT, Cert. DN, Cert. SMT, Dip.  Osteopractic Date: 2020   Reporting Period: -     Certification Period 2020-2020 Time: 7:50 AM NOTE TO PHYSICIAN:  PLEASE COMPLETE THE ORDERS BELOW AND FAX TO   [facility]: [phone]  If you are unable to process this request in 24 hours please contact our office: [phone]    ___ I have read the above report and request that my patient continue as recommended.   ___ I have read the above report and request that my patient continue therapy with the following changes/special instructions:_________________________________________________________   ___ I have read the above report and request that my patient be discharged from therapy.      Physician Signature:        Date:     Time:

## 2020-11-04 NOTE — PROGRESS NOTES
PHYSICAL THERAPY - DAILY TREATMENT NOTE    Patient Name: Constance Watson        Date: 2020  : 1940   YES Patient  Verified  Visit #:     Insurance: Payor: Jonelle Hensley / Plan: [place] & B / Product Type: Medicare /    2  In time: 10:42 Out time: 11:52   Total Treatment Time: 50     Medicare/BCBS Time Tracking (below)   Total Timed Codes (min):  50 1:1 Treatment Time:  40     TREATMENT AREA = Low back pain [M54.5]  Status post lumbar spine operation [Z98.890]    SUBJECTIVE    Pain Level (on 0 to 10 scale):  0  / 10   Medication Changes/New allergies or changes in medical history, any new surgeries or procedures? NO    If yes, update Summary List   Subjective Functional Status/Changes:  []  No changes reported     \"I'm excited to graduate today. I'm a whole lot better than when I first started\"          OBJECTIVE     Therapeutic Procedures:  Min Procedure Specifics + Rationale   n/a [x]  Patient Education (performed throughout session) [x] Review HEP    [] Progressed/Changed HEP based on:   [] proper performance and advancement of Therex/TA   [] reduction in pain level    [] increased functional capacity       [] change in directional preference   30(30) [x] Therapeutic Exercise   [x]  See Flowsheet   Rationale: increase ROM and increase strength to improve the patients ability to participate in ADL's    10(10) [x] Therapeutic Activity   [x]  See Flowsheet  Re-assessment. Rationale:  To improve safety, proprioception, coordination, and efficiency with tasks     Modality rationale: decrease inflammation, decrease pain, increase tissue extensibility and increase muscle contraction/control to improve the patients ability to perform ADL's with greater ease       Min Type Additional Details   10 [x]  Heat         [] pre-FABIAN      [x] post-FABIAN Location:L/s    [] supine              [] prone     [] legs elevated    [] legs flat   [] sitting   [x] Skin assessment post-treatment: [x]intact [x]redness- no adverse reaction       []redness  adverse reaction:     Other Objective/Functional Measures:    See DC     Post Treatment Pain Level (on 0 to 10) scale:   0  / 10     ASSESSMENT    Assessment/Changes in Function:       See DC         Patient will continue to benefit from skilled PT services to n/a  to attain remaining goals   Progress toward goals / Updated goals:    See DC     PLAN    [x]  Upgrade activities as tolerated  [x]  Update interventions per flow sheet NO Continue plan of care   []  Discharge due to :    []  Other:      Therapist: Loida Redding \"BJ\" Lucia, RYT, Cert. MDT, Cert. DN, Cert. SMT, Dip.  Osteopractic    Date: 11/4/2020 Time: 7:49 AM     Future Appointments   Date Time Provider Napoleon Ahmadi   11/4/2020 11:00 AM Maciel Iglesias, PT [facility]

## 2021-08-06 ENCOUNTER — HOSPITAL ENCOUNTER (OUTPATIENT)
Dept: PHYSICAL THERAPY | Age: 81
Discharge: HOME | End: 2021-08-06
Payer: MEDICARE

## 2021-08-06 PROCEDURE — 97110 THERAPEUTIC EXERCISES: CPT

## 2021-08-06 PROCEDURE — 97162 PT EVAL MOD COMPLEX 30 MIN: CPT

## 2021-08-06 NOTE — PROGRESS NOTES
PHYSICAL THERAPY - DAILY TREATMENT NOTE    Patient Name: Angela Pisano        Date: 2021  : 1940   YES Patient  Verified  Visit #:      10  Insurance: Payor: Casi Ciro / Plan: [place] MEDICARE PART A & B / Product Type: Medicare /      In time:  Out time: 105   Total Treatment Time: 45     Medicare/BCBS Time Tracking (below)   Total Timed Codes (min):  10 1:1 Treatment Time:  10     TREATMENT AREA =  Left hip pain [M25.552]    SUBJECTIVE    Pain Level (on 0 to 10 scale):  5  / 10   Medication Changes/New allergies or changes in medical history, any new surgeries or procedures? NO    If yes, update Summary List   Subjective Functional Status/Changes:  []  No changes reported   CC: left hip pain that started 2021 that began after attempting working out at the gym. X-rays were unremarkable. Also complains of low back pain. Symptoms have not improved with time. Locates pain left illiac crest/left LB that extends down LLE when she flexes her hip. Impacts her sitting tolerance    Symptoms:  Pain rating (0-10):                         Today: 5/10                        Best: 5/10                        Worst: 10/10    Aggravated by:sitting, car transfers  Eased by: walking    Patient Goals: \"less pain\"       OBJECTIVE     Physical Therapy Evaluation - Lumbar Spine        OBJECTIVE     Gait: increased left trendelenberg, antalgic, clutching left lateral hip                                     Palpation:  TTP Left GT, Glute Med, TFL                               Stair Negotiation: BUEs, nonreciprocal pattern    Sit<>Stand Transfers: no UE's from standard chair    Bed Mobility: uses UE's for LLE maneuvering    ROM / Strength                                            AROM(PROM)       Strength (1-5)      Left Right Left Right   Hip Flexion (0-120)  60(105) WFL  4- 4+    2 Extension (0-20)     2 2    IR (0-45) 20 23 5 5    ER (0-45) 26 25 4+ 4+     Abduction (0-45)         Knee Flexion (0-135) 4+ 4+     Extension (0)   4+ 4+           Dural Mobility:  SLR Test:              [] R    [x] L        [x] +    [] -            Hip:                      [place]:                                  [] R    [x] L    [x] +    [] -                                     Scour:                                  [] R    [x] L    [] +    [x] -       Flexibility:   Hamstrings(90/90 Test):                         (L) Tightness= [] WNL   [] Min   [x] Mod   [] Severe       Quadriceps (Ely's Test):                         (L) Tightness= [] WNL   [] Min   [] Mod   [x] Severe                  Outcome Measures:  30 Second Chair Rise Test: 9 reps  Single Leg Balance Left: 13\"  Right: 9\"  10 min Therapeutic Exercise:  [x]  See flow sheet   Rationale:      increase ROM and increase strength to improve the patients ability to negotiate various environments in a safe and effective manner. min Patient Education:  YES  Reviewed HEP   []  Progressed/Changed HEP based on: Other Objective/Functional Measures:    See Above     Post Treatment Pain Level (on 0 to 10) scale:   5  / 10     ASSESSMENT    Assessment/Changes in Function:     See POC    Justification for Eval Code Complexity:  Patient History (low 0, mod 1-2, high 3-4): Mod  Examination (low 1-2, mod 3+, high 4+): Mod (see above)  Clinical Presentation (low stable or uncomplicated, mod evolving or changing, high unstable or unpredictable): Mod  Clinical Decision Making (low , mod 26-74, high 1-25): FOTO = Mod     []  See Progress Note/Recertification   Patient will continue to benefit from skilled PT services to analyze,, cue,, progress,, modify,, demonstrate,, instruct, and address, movement patterns,, therapeutic interventions,, postural abnormalities,, soft tissue restrictions,, ROM,, strength,, functional mobility,, body mechanics/ergonomics, and home and community integration, to attain remaining goals.    Progress toward goals / Updated goals:    See POC PLAN    []  Upgrade activities as tolerated YES Continue plan of care   []  Discharge due to :    []  Other:      Therapist: Kavon Delcid DPT    Date: 8/6/2021 Time: 12:28 PM     Future Appointments   Date Time Provider Napoleon Ahmadi   8/6/2021 12:30 PM [id number] [facility], [address] [place] [address]

## 2021-08-06 NOTE — PROGRESS NOTES
[facility] PHYSICAL THERAPY  [address] [facility] [address], [address] - Phone: [phone]  Fax: [phone] / [address] [facility]  Patient Name: Caryn Woody : 1940   Medical   Diagnosis: Left hip pain [M25.552] Treatment Diagnosis: Left hip pain [M25.552]   Onset Date: 2021     Referral Source: Francisco Diallo MD [facility]): 2021   Prior Hospitalization: See medical history Provider #: [id number]   Prior Level of Function: History of back pain   Comorbidities: OA, Thyroid Problems   Medications: Verified on Patient Summary List   The Plan of Care and following information is based on the information from the initial evaluation.   ==========================================================================================  Assessment / key information:  Pt is a 80year old female who presents to PT today increased posterolateral left hip pain that began 2021. Symptoms are exacerbated by prolonged sitting, car transfers and bed mobility. Assessment revealed decreased hip ROM in all planes, decreased hip strength, impaired gait/transfers, and decreased balance. The resulting condition has affected their ability to negotiate her environment safely. Patient with a Functional Status score of 47 on FOTO (Focused on Therapeutic Outcomes), which corresponds to a functional limitation of 53%. Patient will benefit from skilled PT services to address these issues.     Gait: increased left trendelenberg, antalgic, clutching left lateral hip                                      Palpation:  TTP Left GT, Glute Med, TFL                                Stair Negotiation: BUEs, nonreciprocal pattern     Sit<>Stand Transfers: no UE's from standard chair     Bed Mobility: uses UE's for LLE maneuvering                                              AROM(PROM)       Strength (1-5)      Left Right Left Right Hip Flexion (0-120)  60(105) WFL  4- 4+     Extension (0-20)     2 2     IR (0-45) 20 23 5 5     ER (0-45) 26 25 4+ 4+     Abduction (0-45)           Knee Flexion (0-135)     4+ 4+     Extension (0)     4+ 4+        Dural Mobility:  SLR Test:                     []? R    [x]? L        [x]? +    []? -            Hip:                      Roland:                          []? R    [x]? L    [x]? +    []? -                                     Scour:                         []? R    [x]? L    []? +    [x]?  -       Flexibility:   Hamstrings(90/90 Test):                      (L) Tightness= []? WNL   []? Min   [x]? Mod   []? Severe       Quadriceps (Ely's Test):                     (L) Tightness= []? WNL   []? Min   []? Mod [x]?  Severe                 Outcome Measures:  30 Second Chair Rise Test: 9 reps  Single Leg Balance Left: 13\"  Right: 9\"      Pt was provided a HEP today and educated regarding their diagnosis and prognosis.  Thank you for this referral.   ==========================================================================================  Eval Complexity: History: MEDIUM  Complexity : 1-2 comorbidities / personal factors will impact the outcome/ POC Exam:MEDIUM Complexity : 3 Standardized tests and measures addressing body structure, function, activity limitation and / or participation in recreation  Presentation: MEDIUM Complexity : Evolving with changing characteristics  Clinical Decision Making:MEDIUM Complexity : FOTO score of 26-74Overall Complexity:MEDIUM    Problem List: pain affecting function, decrease ROM, decrease strength, impaired gait/ balance, decrease ADL/ functional abilitiies, decrease activity tolerance, decrease flexibility/ joint mobility and decrease transfer abilities   Treatment Plan may include any combination of the following: Therapeutic exercise, Therapeutic activities, Neuromuscular re-education, Physical agent/modality, Gait/balance training, Manual therapy, Patient education, Self Care training, Functional mobility training, Home safety training and Stair training  Patient / Family readiness to learn indicated by: asking questions, trying to perform skills and interest  Persons(s) to be included in education: patient (P)  Barriers to Learning/Limitations: None  Patient Goal (s):  \"less pain\"   Patient self reported health status: good  Rehabilitation Potential: good   Short Term Goals: To be accomplished in  3  weeks:  1. Pt will be compliant with HEP for symptom management at home. 2. Pt will demonstrate left hip ER AROM to at least 25 deg to improve stance phase.  Long Term Goals: To be accomplished in  5  weeks:  1. Pt will be independent with HEP at D/C for self management. 2. Pt will demonstrate left hip flexion AROM to at least 105 deg to improve stair negotiation. 3. Pt will ambulate 300 feet with LRAD to improve quality of life. 4. Pt will increase FOTO Functional Status score to 63 to decrease functional limitations. 5. Pt will demonstrate left hip flexion strength of at least 4+/5 to engage in age appropriate activities. Frequency / Duration:   Patient to be seen  2-3  times per week for 5  weeks:  Patient / Caregiver education and instruction: provided education regarding diagnosis and prognosis as well as details regarding treatment plain. activity modification and exercises  Therapist Signature: Charito Valenzuela DPT Date: 8/0/4321   Certification Period: 8/6/2021 - 11/5/2021 Time: 11:59 AM   ===========================================================================================  I certify that the above Physical Therapy Services are being furnished while the patient is under my care. I agree with the treatment plan and certify that this therapy is necessary. Physician Signature:        Date:       Time:     Fabienne Johnson MD  Please sign and return to [employer] or you may fax the signed copy to [phone]. Thank you.

## 2021-08-09 ENCOUNTER — HOSPITAL ENCOUNTER (OUTPATIENT)
Dept: PHYSICAL THERAPY | Age: 81
Discharge: HOME | End: 2021-08-09
Payer: MEDICARE

## 2021-08-09 PROCEDURE — 97014 ELECTRIC STIMULATION THERAPY: CPT

## 2021-08-09 PROCEDURE — 97110 THERAPEUTIC EXERCISES: CPT

## 2021-08-09 PROCEDURE — 97140 MANUAL THERAPY 1/> REGIONS: CPT

## 2021-08-09 NOTE — PROGRESS NOTES
PHYSICAL THERAPY - DAILY TREATMENT NOTE    Patient Name: Jacqui Gutierrez        Date: 2021  : 1940   YES Patient  Verified  Visit #:   2   of   10  Insurance: Payor: Constantine Vicky / Plan: [place] MEDICARE PART A & B / Product Type: Medicare /      In time: 11:45 Out time: 12:40   Total Treatment Time: 55     Medicare/BCBS [place] Time Tracking (below)   Total Timed Codes (min):  45 1:1 Treatment Time:  45     TREATMENT AREA =  Left hip pain [M25.552    SUBJECTIVE    Pain Level (on 0 to 10 scale):  8-10  / 10   Medication Changes/New allergies or changes in medical history, any new surgeries or procedures? NO    If yes, update Summary List   Subjective Functional Status/Changes:  []  No changes reported     My left hip hurts especially when getting up from a chair. Describes working out and performing seated resisted hip abduction exercise at gym, she thinks this may have resulted in current pain. OBJECTIVE    30 min Therapeutic Exercise:  [x]  See flow sheet   Rationale:      increase ROM and increase strength to improve the patients ability to perform ADL's with greater ease. 15 min Manual Therapy: See flow sheet  Light TPR to left gluteus medius ms   Rationale:      decrease pain, increase ROM, increase tissue extensibility and decrease trigger points to improve patient's ability to perform functional mobility with less compensation and pain. The manual therapy interventions were performed at a separate and distinct time from the therapeutic activities interventions    Modalities Rationale:    decrease pain and increase tissue extensibility to improve patient's ability to complete functional tasks with less pain.    10 min [x] Estim, type/location: IFC to left glute and bilat lower back supine with wedge                                      []  att     [x]  unatt     []  w/US     []  w/ice    [x]  w/heat   [x] Skin assessment post-treatment (if applicable):    [x]  intact    [] redness- no adverse reaction     []redness  adverse reaction:       min Patient Education:  YES  Reviewed HEP   []  Progressed/Changed HEP based on:   Cont with HEP     Other Objective/Functional Measures:    TTP directly over gluteus medius ms and tendon; introduced new exercises and stretches aimed at gently mobilizing/ strengthening hips. Gentle TPR to glute medius performed with decent tolerance. Poor bed mobility; pain with all transitional movements including sit to stand from low positions. Able to complete sit to stands from slightly elevated position (approx 21\") with minimal to no pain. Post Treatment Pain Level (on 0 to 10) scale:   7  / 10     ASSESSMENT    Assessment/Changes in Function:     Signs and symptoms suggest possible gluteal tendinopathy. Session tolerated well, assess response next visit and modifyprogress as needed. []  See Progress Note/Recertification   Patient will continue to benefit from skilled PT services to analyze, cue, progress, modify,, demonstrate, instruct, and address, movement patterns, therapeutic interventions, postural abnormalities, soft tissue restrictions, ROM, strength, functional mobility, body mechanics/ergonomics, and home and community integration, to attain remaining goals. Progress toward goals / Updated goals: · Short Term Goals: To be accomplished in  3  weeks:  1. Pt will be compliant with HEP for symptom management at home. 2. Pt will demonstrate left hip ER AROM to at least 25 deg to improve stance phase.      PLAN    []  Upgrade activities as tolerated YES Continue plan of care   []  Discharge due to :    []  Other:      Therapist: Dieudonne Ugarte PT, DPT    Date: 8/9/2021 Time: 7:59 AM     Future Appointments   Date Time Provider Napoleon Ahmadi   8/9/2021 11:45 AM Judith Muñoz PT [facility]   8/12/2021  2:00 PM Judith Muñoz PT [facility]   8/16/2021  2:45 PM Andi ROLAND [facility]   8/20/2021 11:45 AM Ananya Suzette Andersen [facility]   8/23/2021  1:15 PM Agusto Barrett, PT [facility]   8/26/2021  2:00 PM Agusto Barrett PT [facility]   8/31/2021  3:30 PM [address] [facility]

## 2021-08-12 ENCOUNTER — HOSPITAL ENCOUNTER (OUTPATIENT)
Dept: PHYSICAL THERAPY | Age: 81
Discharge: HOME | End: 2021-08-12
Payer: MEDICARE

## 2021-08-12 PROCEDURE — 97110 THERAPEUTIC EXERCISES: CPT

## 2021-08-12 PROCEDURE — 97140 MANUAL THERAPY 1/> REGIONS: CPT

## 2021-08-12 PROCEDURE — 97014 ELECTRIC STIMULATION THERAPY: CPT

## 2021-08-12 NOTE — PROGRESS NOTES
PHYSICAL THERAPY - DAILY TREATMENT NOTE    Patient Name: Oracio Mcclure        Date: 2021  : 1940   YES Patient  Verified  Visit #:   3   of   10  Insurance: Payor: Jose Angelica / Plan: [place] MEDICARE PART A & B / Product Type: Medicare /      In time: 1:45 Out time: 2:40   Total Treatment Time: 50     Medicare/BCBS [place] Time Tracking (below)   Total Timed Codes (min):  40 1:1 Treatment Time:  40     TREATMENT AREA =  Left hip pain [M25.552    SUBJECTIVE    Pain Level (on 0 to 10 scale):  8  / 10   Medication Changes/New allergies or changes in medical history, any new surgeries or procedures? NO    If yes, update Summary List   Subjective Functional Status/Changes:  []  No changes reported     Pt reports ability to walk slightly longer (10 min) before onset of pain. OBJECTIVE    25 min Therapeutic Exercise:  [x]  See flow sheet   Rationale:      increase ROM and increase strength to improve the patients ability to perform ADL's with greater ease. 15 min Manual Therapy: See flow sheet  TPR to glute medius ms  Passive stretching of glute muscles in supine f/b passive stretching to piriformis in supine    Rationale:      decrease pain, increase ROM, increase tissue extensibility and decrease trigger points to improve patient's ability to perform functional mobility with less compensation and pain. The manual therapy interventions were performed at a separate and distinct time from the therapeutic activities interventions    Modalities Rationale:    decrease pain and increase tissue extensibility to improve patient's ability to complete functional tasks with less pain.    10 min [x] Estim, type/location: bilat lumbar spine and (2) to L glute medius; supine post tx with wedge                                    []  att     [x]  unatt     []  w/US     []  w/ice    [x]  w/heat   [x] Skin assessment post-treatment (if applicable):    [x]  intact    [x]  redness- no adverse reaction []redness  adverse reaction:       min Patient Education:  YES  Reviewed HEP   []  Progressed/Changed HEP based on:   Cont with HEP     Other Objective/Functional Measures:    Progressed with table glute strengthening exercises; low volume/low load today     Post Treatment Pain Level (on 0 to 10) scale:   5  / 10     ASSESSMENT    Assessment/Changes in Function:     Signs and symptoms suggest gluteal tendinopathy. Consider incorporating core strengthening/stability next visit. Progress and modify as symptoms allow. []  See Progress Note/Recertification   Patient will continue to benefit from skilled PT services to analyze, cue, progress, modify,, demonstrate, instruct, and address, movement patterns, therapeutic interventions, postural abnormalities, soft tissue restrictions, ROM, strength, functional mobility, body mechanics/ergonomics, and home and community integration, to attain remaining goals. Progress toward goals / Updated goals:    · Term Goals: To be accomplished in  3  weeks:  1. Pt will be compliant with HEP for symptom management at home. Ongoing  2. Pt will demonstrate left hip ER AROM to at least 25 deg to improve stance phase.        PLAN    []  Upgrade activities as tolerated YES Continue plan of care   []  Discharge due to :    []  Other:      Therapist: Chaya Fritz PT, DPT    Date: 8/12/2021 Time: 9:06 AM     Future Appointments   Date Time Provider Napoleon Ahmadi   8/12/2021  2:00 PM Sharon Delong PT [facility]   8/16/2021  2:45 PM Jonn Brennan [facility]   8/20/2021 11:45 AM Sharon Delong PT [facility]   8/23/2021  1:15 PM Sharon Delong PT [facility]   8/26/2021  2:00 PM Sharon Delong PT [facility]   8/31/2021  3:30 PM [facility] [facility]

## 2021-08-16 ENCOUNTER — HOSPITAL ENCOUNTER (OUTPATIENT)
Dept: PHYSICAL THERAPY | Age: 81
Discharge: HOME | End: 2021-08-16
Payer: MEDICARE

## 2021-08-16 PROCEDURE — 97530 THERAPEUTIC ACTIVITIES: CPT

## 2021-08-16 PROCEDURE — 97110 THERAPEUTIC EXERCISES: CPT

## 2021-08-16 NOTE — PROGRESS NOTES
PHYSICAL THERAPY - DAILY TREATMENT NOTE    Patient Name: Indiana García        Date: 2021  : 1940   YES Patient  Verified  Visit #:      10  Insurance: Payor: Claud Fothergill / Plan: [place] MED[place]ARE PART A & B / Product Type: Medicare /      In time: 2:48 Out time: 3:31   Total Treatment Time: 43     Medicare/BCBS [place] Time Tracking (below)   Total Timed Codes (min):  43 1:1 Treatment Time:  43     TREATMENT AREA =  Left hip pain [M25.552]    SUBJECTIVE    Pain Level (on 0 to 10 scale):   10   Medication Changes/New allergies or changes in medical history, any new surgeries or procedures? NO    If yes, update Summary List   Subjective Functional Status/Changes:  []  No changes reported     Pt reports not getting sore after her therapy sessions. OBJECTIVE    35 min Therapeutic Exercise:  [x]  See flow sheet   Rationale:    increase ROM, increase strength, improve coordination, improve balance and increase proprioception to promote increased functional mobility and increased activity tolerance with ADL's.    8 min Therapeutic Activity: heel to toe gait   sit<>stand    Rationale:    increase ROM, increase strength, improve coordination, improve balance and increase proprioception to promote increased functional mobility and increased activity tolerance with ADL's.      min Patient Education:  YES  Reviewed HEP   []  Progressed/Changed HEP based on:   Pt reports compliance with HEP     Other Objective/Functional Measures:    Pt ambulating with antalgic gait  (decreased stride length, lacks HS and decreased stance time left LE). Cued pt for heel to toe gait     Added HS stretch, pt with good tolerance. Supine stretch with belt, A to support LE. Pt c/o left hip pain and demo's limited hip flexion AROM with attempting standing march. Mod VCing for hip hinge with sit<>stand.       Post Treatment Pain Level (on 0 to 10) scale:   5  / 10     ASSESSMENT    Assessment/Changes in Function:     Pt continues with painful left hip causing antalgic gait. []  See Progress Note/Recertification   Patient will continue to benefit from skilled PT services to modify and progress therapeutic interventions, address functional mobility deficits, address ROM deficits, address strength deficits, analyze and address soft tissue restrictions, analyze and cue movement patterns, assess and modify postural abnormalities, and instruct in home and community integration to attain remaining goals. Progress toward goals / Updated goals:    1. Pt will be compliant with HEP for symptom management at home.  pt reports compliance with current HEP  2. Pt will demonstrate left hip ER AROM to at least 25 deg to improve stance phase. hip ER stretch     PLAN    []  Upgrade activities as tolerated YES Continue plan of care   []  Discharge due to :    []  Other:      Therapist: Catie Dorsey PTA    Date: 8/16/2021 Time: 2:53 PM     Future Appointments   Date Time Provider Napoleon Ahmadi   8/20/2021 11:00 AM Tyson Olivia [facility]   8/23/2021  1:15 PM Steven Montez PT [facility]   8/26/2021  2:00 PM Steven Montez PT [facility]   8/31/2021  3:30 PM [facility] [facility]

## 2021-08-20 ENCOUNTER — HOSPITAL ENCOUNTER (OUTPATIENT)
Dept: PHYSICAL THERAPY | Age: 81
Discharge: HOME | End: 2021-08-20
Payer: MEDICARE

## 2021-08-20 PROCEDURE — 97110 THERAPEUTIC EXERCISES: CPT

## 2021-08-20 PROCEDURE — 97140 MANUAL THERAPY 1/> REGIONS: CPT

## 2021-08-20 NOTE — PROGRESS NOTES
PHYSICAL THERAPY - DAILY TREATMENT NOTE    Patient Name: Rufus Calderon        Date: 2021  : 1940   YES Patient  Verified  Visit #:      10  Insurance: Payor: Zacarias Higuera / Plan: [place] MEDICARE PART A & B / Product Type: Medicare /      In time: 11:00 Out time: 11:39   Total Treatment Time: 39     Medicare/BCBS [place] Time Tracking (below)   Total Timed Codes (min):  39 1:1 Treatment Time:  39     TREATMENT AREA =  Left hip pain [M25.552]    SUBJECTIVE    Pain Level (on 0 to 10 scale):  5  / 10   Medication Changes/New allergies or changes in medical history, any new surgeries or procedures? NO    If yes, update Summary List   Subjective Functional Status/Changes:  []  No changes reported     Pt reports her hip was a little sore after last PT session, but overall she is seeing a little improvement. Able to lift her leg into the car a little better, but still hurts and has to help lift it some. Pt states she had a urologist appointment and had to sit with her legs open, afterward her left hip was really sore coming out of the position. OBJECTIVE    31 min Therapeutic Exercise:  [x]  See flow sheet   Rationale:    increase ROM, increase strength, improve coordination, improve balance and increase proprioception to promote increased functional mobility and increased activity tolerance with ADL's.    8 min Manual Therapy: The manual therapy interventions were performed at a separate and distinct time from the therapeutic activities interventions. STM to left glutes with pt in right S/L    Rationale:  decrease pain, increase tissue extensibility and decrease trigger points to improve patient's ability to perform ADL's with greater ease and less pain. min Patient Education:  YES  Reviewed HEP   []  Progressed/Changed HEP based on:   Updated HEP-see copy in chart      Other Objective/Functional Measures:    Pt with TTP left glutes, but with good tolerance to STM.      Increased reps for standing hip abd and ext with moderate ms fatigue. Added LTR for hip stretching. Post Treatment Pain Level (on 0 to 10) scale:   4  / 10     ASSESSMENT    Assessment/Changes in Function:     Pt with left hip pain with positional changes. []  See Progress Note/Recertification   Patient will continue to benefit from skilled PT services to modify and progress therapeutic interventions, address functional mobility deficits, address ROM deficits, address strength deficits, analyze and address soft tissue restrictions, analyze and cue movement patterns, assess and modify postural abnormalities, and instruct in home and community integration to attain remaining goals. Progress toward goals / Updated goals:    1. Pt will be compliant with HEP for symptom management at home. pt reporting compliance with HEP. 2. Pt will demonstrate left hip ER AROM to at least 25 deg to improve stance phase. continue LE stretches   · Long Term Goals: To be accomplished in  5  weeks:  1. Pt will be independent with HEP at D/C for self management. 2. Pt will demonstrate left hip flexion AROM to at least 105 deg to improve stair negotiation. 3. Pt will ambulate 300 feet with LRAD to improve quality of life. 4. Pt will increase FOTO Functional Status score to 63 to decrease functional limitations. 5. Pt will demonstrate left hip flexion strength of at least 4+/5 to engage in age appropriate activities.       PLAN    []  Upgrade activities as tolerated YES Continue plan of care   []  Discharge due to :    []  Other:      Therapist: Clovia Schirmer, PTA    Date: 8/20/2021 Time: 11:00 AM     Future Appointments   Date Time Provider Napoleon Ahmadi   8/23/2021  1:15 PM Sunita Parents, PT [facility]   8/26/2021  2:00 PM Sunita Parents, PT [facility]   8/31/2021  3:30 PM [address] [place] [address] [facility]   [facility]2021  [facility]   9/8/2021  9:30 AM Paticia Dearth [facility]   9/13/2021 9:30 AM Rhett Cuenca, PT [facility]   9/15/2021  9:30 AM Bert Joyce [facility]   9/21/2021  9:30 AM Bert Joyce [facility]   9/23/2021  9:30 AM Bert Joyce [facility]   9/27/2021  9:30 AM Zoila Hare, PT [facility]   9/29/2021  9:30 AM Zoila Hare, PT [facility]

## 2021-08-23 ENCOUNTER — HOSPITAL ENCOUNTER (OUTPATIENT)
Dept: PHYSICAL THERAPY | Age: 81
Discharge: HOME | End: 2021-08-23
Payer: MEDICARE

## 2021-08-23 PROCEDURE — 97140 MANUAL THERAPY 1/> REGIONS: CPT

## 2021-08-23 PROCEDURE — 97110 THERAPEUTIC EXERCISES: CPT

## 2021-08-23 PROCEDURE — 97014 ELECTRIC STIMULATION THERAPY: CPT

## 2021-08-23 NOTE — PROGRESS NOTES
PHYSICAL THERAPY - DAILY TREATMENT NOTE    Patient Name: Zack Camp        Date: 2021  : 1940   YES Patient  Verified  Visit #:   6   of   10  Insurance: Payor: Jamie Pan / Plan: [place] MEDICARE PART A & B / Product Type: Medicare /      In time: 12:05 Out time: 12:58   Total Treatment Time: 53     Medicare/BCBS [place] Time Tracking (below)   Total Timed Codes (min):  53 1:1 Treatment Time:  53     TREATMENT AREA =  Left hip pain [M25.552]    SUBJECTIVE    Pain Level (on 0 to 10 scale):  4  / 10   Medication Changes/New allergies or changes in medical history, any new surgeries or procedures? NO    If yes, update Summary List   Subjective Functional Status/Changes:  []  No changes reported     Pt arrived one hour early - able to adjust schedule to see her early. Pt states feeling good after last visit. Improved tolerance to walking activities and feels she is making progress. OBJECTIVE    27 min Therapeutic Exercise:  [x]  See flow sheet   Rationale:      increase ROM and increase strength to improve the patients ability to perform ADL's with greater ease. 13 min Manual Therapy: See flow sheet   Rationale:      decrease pain, increase ROM and increase tissue extensibility to improve patient's ability to perform functional mobility with less compensation and pain. The manual therapy interventions were performed at a separate and distinct time from the therapeutic activities interventions    Modalities Rationale:    decrease pain and increase tissue extensibility to improve patient's ability to complete functional tasks with less pain.    10 min [x] Estim, type/location: IFC to bilat lumbar spine (2) and L glute (2) supine with wedge post tx                                     []  att     [x]  unatt     []  w/US     []  w/ice    [x]  w/heat   [x] Skin assessment post-treatment (if applicable):    [x]  intact    []  redness- no adverse reaction     []redness  adverse reaction:       min Patient Education:  YES  Reviewed HEP   []  Progressed/Changed HEP based on:   Cont with HEP     Other Objective/Functional Measures:    Pain levels steadily reducing. Poor bed mobility due to hip pain. Pt demonstrates mild shuffling with gait; new HK activity included to address. Pain with step tap ups on first step today, however pain reduced with repetition and pt able to complete 10 reps. TPR performed to glute medius in s/l position, good tolerance. Post Treatment Pain Level (on 0 to 10) scale:   4  / 10     ASSESSMENT    Assessment/Changes in Function:     Session tolerated well, pt progressing slowly but steadily. []  See Progress Note/Recertification   Patient will continue to benefit from skilled PT services to analyze, cue, progress, modify,, demonstrate, instruct, and address, movement patterns, therapeutic interventions, postural abnormalities, soft tissue restrictions, ROM, strength, functional mobility, body mechanics/ergonomics, and home and community integration, to attain remaining goals. Progress toward goals / Updated goals:    1. Pt will be compliant with HEP for symptom management at home. pt reporting compliance with HEP. 2. Pt will demonstrate left hip ER AROM to at least 25 deg to improve stance phase. continue LE stretches   · Long Term Goals: To be accomplished in  5  weeks:  1. Pt will be independent with HEP at D/C for self management. 2. Pt will demonstrate left hip flexion AROM to at [place] deg to improve stair negotiation. 3. Pt will ambulate 300 feet with LRAD to improve quality of life. 4. Pt will increase FOTO Functional Status score to 63 to decrease functional limitations. 5. Pt will demonstrate left hip flexion strength of at least 4+/5 to engage in age appropriate activities.       PLAN    []  Upgrade activities as tolerated YES Continue plan of care   []  Discharge due to :    []  Other:      Therapist: Maral Flower, PT, DPT Date: 8/23/2021 Time: 11:45 AM     Future Appointments   Date Time Provider Napoleon Ahmadi   8/23/2021  1:15 PM Jami Ponce, PT [facility]   8/26/2021  2:00 PM Jami Ponce PT [facility]   8/31/2021  3:30 PM [facility]   9/2/2021  9:30 AM Terryl [facility]   9/8/2021  9:30 AM [facility]   9/13/2021  9:30 AM Jami Ponce PT [facility]   9/15/2021  9:30 [facility]   9/21/2021  9:30 AM [facility]   9/23/2021  9:30 AM [facility]   9/27/2021  9:30 AM Zoila Hare, PT [facility]   9/29/2021  9:30 AM Zoila Hare, PT [facility]

## 2021-08-26 ENCOUNTER — HOSPITAL ENCOUNTER (OUTPATIENT)
Dept: PHYSICAL THERAPY | Age: 81
Discharge: HOME | End: 2021-08-26
Payer: MEDICARE

## 2021-08-26 PROCEDURE — 97014 ELECTRIC STIMULATION THERAPY: CPT

## 2021-08-26 PROCEDURE — 97140 MANUAL THERAPY 1/> REGIONS: CPT

## 2021-08-26 PROCEDURE — 97110 THERAPEUTIC EXERCISES: CPT

## 2021-08-26 NOTE — PROGRESS NOTES
PHYSICAL THERAPY - DAILY TREATMENT NOTE    Patient Name: Lawyer Patiño        Date: 2021  : 1940   YES Patient  Verified  Visit #:      10  Insurance: Payor: Opal Ramsay / Plan: [place] MEDICARE PART A & B / Product Type: Medicare /      In time: 1:30 Out time: 2:30   Total Treatment Time: 60     Medicare/BCBS [place] Time Tracking (below)   Total Timed Codes (min):  50 1:1 Treatment Time:  50     TREATMENT AREA =  Left hip pain [M25.552]    SUBJECTIVE    Pain Level (on 0 to 10 scale):  4  / 10   Medication Changes/New allergies or changes in medical history, any new surgeries or procedures? NO    If yes, update Summary List   Subjective Functional Status/Changes:  []  No changes reported     Patient reports feeling the pain is steadily reducing. Cont to experience pain in posterior left hip. OBJECTIVE    35 min Therapeutic Exercise:  [x]  See flow sheet   Rationale:      increase ROM and increase strength to improve the patients ability to perform ADL's with greater ease. 10 min Manual Therapy: See flow sheet  TPR piriformis and glute medius on L; R s/l positioning    Rationale:      decrease pain, increase ROM, increase tissue extensibility and decrease trigger points to improve patient's ability to perform functional mobility with less compensation and pain. The manual therapy interventions were performed at a separate and distinct time from the therapeutic activities interventions    Modalities Rationale:    decrease pain and increase tissue extensibility to improve patient's ability to complete functional tasks with less pain.    10 min [x] Estim, type/location: L posterior hip and bilat lumbar spine; supine post tx with wedge                                     []  att     []  unatt     []  w/US     []  w/ice    [x]  w/heat   [x] Skin assessment post-treatment (if applicable):    [x]  intact    []  redness- no adverse reaction     []redness  adverse reaction:       min Patient Education:  YES  Reviewed HEP   []  Progressed/Changed HEP based on:   Cont with HEP     Other Objective/Functional Measures:    TTP over piriformis and glute medius remains. Cont to demonstrate difficulty with bed mobility especially with movements requiring active hip flexion. Post Treatment Pain Level (on 0 to 10) scale:   3  / 10     ASSESSMENT    Assessment/Changes in Function:     Pt steadily improving and making progress towards goals. []  See Progress Note/Recertification   Patient will continue to benefit from skilled PT services to analyze, cue, progress, modify,, demonstrate, instruct, and address, movement patterns, therapeutic interventions, postural abnormalities, soft tissue restrictions, ROM, strength, functional mobility, body mechanics/ergonomics, and home and community integration, to attain remaining goals. Progress toward goals / Updated goals:    1. Pt will be compliant with HEP for symptom management at home. pt reporting compliance with HEP.   2. Pt will demonstrate left hip ER AROM to at least 25 deg to improve stance phase. continue LE stretches   · Long Term Goals: To be accomplished in  5  weeks:  1. Pt will be independent with HEP at D/C for self management. 2. Pt will demonstrate left hip flexion AROM to at [place] deg to improve stair negotiation. 3. Pt will ambulate 300 feet with LRAD to improve quality of life. 4. Pt will increase FOTO Functional Status score to 63 to decrease functional limitations.   5. Pt will demonstrate left hip flexion strength of at least 4+/5 to engage in age appropriate activities.        PLAN    []  Upgrade activities as tolerated YES Continue plan of care   []  Discharge due to :    []  Other:      Therapist: Sean Owen, PT, DPT    Date: 8/26/2021 Time: 9:10 AM     Future Appointments   Date Time Provider Napoleon Ahmadi   8/26/2021  2:00 PM Giulia Doty PT [facility]   8/31/2021  3:30 PM [address] [facility] 9/2/2021  9:30 AM Curtistine Windsor [facility]   9/8/2021  9:30 AM Curtistine Windsor [facility]   9/13/2021  9:30 AM Zhen Caban, VINEET [facility]   9/15/2021  9:30 AM Curtistine Windsor [facility]   9/21/2021  9:30 AM Curtistine [place] [facility]   9/23/2021  9:30 AM Curtistine [place] [facility]   9/27/2021  9:30 AM Zoila Hare, PT [facility]   9/29/2021  9:30 AM Zoila Hare, PT [facility]

## 2021-08-31 ENCOUNTER — HOSPITAL ENCOUNTER (OUTPATIENT)
Dept: PHYSICAL THERAPY | Age: 81
Discharge: HOME | End: 2021-08-31
Payer: MEDICARE

## 2021-08-31 PROCEDURE — 97530 THERAPEUTIC ACTIVITIES: CPT

## 2021-08-31 PROCEDURE — 97014 ELECTRIC STIMULATION THERAPY: CPT

## 2021-08-31 PROCEDURE — 97110 THERAPEUTIC EXERCISES: CPT

## 2021-09-02 ENCOUNTER — HOSPITAL ENCOUNTER (OUTPATIENT)
Dept: PHYSICAL THERAPY | Age: 81
Discharge: HOME | End: 2021-09-02
Payer: MEDICARE

## 2021-09-02 PROCEDURE — 97014 ELECTRIC STIMULATION THERAPY: CPT

## 2021-09-02 PROCEDURE — 97110 THERAPEUTIC EXERCISES: CPT

## 2021-09-02 NOTE — PROGRESS NOTES
PHYSICAL THERAPY - DAILY TREATMENT NOTE    Patient Name: Shu Be        Date: 2021  : 1940   YES Patient  Verified  Visit #:   9   of   10  Insurance: Payor: Bill Gregory / Plan: [place] MEDICARE PART A & B / Product Type: Medicare /      In time: 9:35 Out time: 10:36   Total Treatment Time: 61     Medicare/BCBS [place] Time Tracking (below)   Total Timed Codes (min):  51 1:1 Treatment Time:  51     TREATMENT AREA =  Left hip pain [M25.552]    SUBJECTIVE    Pain Level (on 0 to 10 scale):  2  / 10   Medication Changes/New allergies or changes in medical history, any new surgeries or procedures? NO    If yes, update Summary List   Subjective Functional Status/Changes:  []  No changes reported     Pt reports she can lift her left leg a little better. Pt states she is doing her sitting and lying down exercises at home. OBJECTIVE    Modalities Rationale:  decrease pain and increase tissue extensibility to improve patient's ability to complete functional tasks with less pain.    10 min [x] Estim, type/location: IFC to left glute, Patient supine with LE wedge                                     []  att     [x]  unatt     []  w/US     []  w/ice    [x]  w/heat    min []  Mechanical Traction: type/lbs                   []  pro   []  sup   []  int   []  cont    []  before manual    []  after manual    min []  Ultrasound, settings/location:      min []  Iontophoresis w/ dexamethasone, location:                                               []  take home patch       []  in clinic    min []  Ice     []  Heat    location/position:     min []  Vasopneumatic Device, press/temp:     min []  Other:    [x] Skin assessment post-treatment (if applicable):    [x]  intact    []  redness- no adverse reaction     []redness  adverse reaction:      41 min Therapeutic Exercise:  [x]  See flow sheet   Rationale:    increase ROM, increase strength, improve coordination, improve balance and increase proprioception to promote increased functional mobility and increased activity tolerance with ADL's. 10 min Manual Therapy: The manual therapy interventions were performed at a separate and distinct time from the therapeutic activities interventions. TPR and STM to piriformis and glute medius on L; R s/l positioning    Rationale:  decrease pain, increase tissue extensibility and decrease trigger points to improve patient's ability to perform ADL's with greater ease and less pain. min Patient Education:  YES  Reviewed HEP   []  Progressed/Changed HEP based on:   Pt reports compliance with HEP     Other Objective/Functional Measures:    Pt ambulating with less antalgic gait this session, demo's increased stride length and heel to toe gait pattern. Pt reports ms fatigue B hips with most ex. Pt demo's increased ease and AROM with hip flexion in sitting and standing, but does report mild buttocks pain with increased reps of hip flexion with therapeutic exercise. Post Treatment Pain Level (on 0 to 10) scale:   1  / 10     ASSESSMENT    Assessment/Changes in Function:     Pt demo's improved tolerance and AROM with hip flexion left LE.      []  See Progress Note/Recertification   Patient will continue to benefit from skilled PT services to modify and progress therapeutic interventions, address functional mobility deficits, address ROM deficits, address strength deficits, analyze and address soft tissue restrictions, analyze and cue movement patterns, assess and modify postural abnormalities, and instruct in home and community integration to attain remaining goals. Progress toward goals / Updated goals:    1. Pt will be compliant with HEP for symptom management at home. pt reports compliance with HEP  2. Pt will demonstrate left hip ER AROM to at least 25 deg to improve stance phase. · Long Term Goals: To be accomplished in  5  weeks:  1. Pt will be independent with HEP at D/C for self management.   2. Pt will demonstrate left hip flexion AROM to at least 105 deg to improve stair negotiation. pt demo's increased ease and less pain with AROM hip flex LLE   3. Pt will ambulate 300 feet with LRAD to improve quality of life. 4. Pt will increase FOTO Functional Status score to 63 to decrease functional limitations. 5. Pt will demonstrate left hip flexion strength of at least 4+/5 to engage in age appropriate activities. PLAN    []  Upgrade activities as tolerated YES Continue plan of care   []  Discharge due to :    [x]  Other: reassess progress toward goals [place].       Therapist: Ck Carbajal PTA    Date: 9/2/2021 Time: 9:50 AM     Future Appointments   Date Time Provider Napoleon Ahmadi   9/8/2021  9:30 AM [facility]   9/13/2021  9:30 AM Josiah Spears, [facility]   9/15/2021  9:30 AM Sandra [facility]   9/21/2021  9:30 AM Sandra [facility]   9/23/2021  9:30 AM Sandra Buitrago [facility]   9/27/2021  9:30 AM Ananya [address] [place] [address], PT [facility]   9/29/2021  9:30 AM Zoila Hare, PT [facility]

## 2021-09-08 ENCOUNTER — HOSPITAL ENCOUNTER (OUTPATIENT)
Dept: PHYSICAL THERAPY | Age: 81
Discharge: HOME | End: 2021-09-08
Payer: MEDICARE

## 2021-09-08 PROCEDURE — 97110 THERAPEUTIC EXERCISES: CPT

## 2021-09-08 PROCEDURE — 97014 ELECTRIC STIMULATION THERAPY: CPT

## 2021-09-08 NOTE — PROGRESS NOTES
PHYSICAL THERAPY - DAILY TREATMENT NOTE    Patient Name: Warner Zamora        Date: 2021  : 1940   YES Patient  Verified  Visit #:   10   of   10  Insurance: Payor: Varun Arnold / Plan: [place] MEDICARE PART A & B / Product Type: Medicare /      In time: 9:30 Out time: 10:19   Total Treatment Time: 49     Medicare/BCBS [place] Time Tracking (below)   Total Timed Codes (min):  49 1:1 Treatment Time:  49     TREATMENT AREA =  Left hip pain [M25.552]    SUBJECTIVE    Pain Level (on 0 to 10 scale):  3  / 10   Medication Changes/New allergies or changes in medical history, any new surgeries or procedures? NO    If yes, update Summary List   Subjective Functional Status/Changes:  []  No changes reported     Pt reports she has good and bad days with her left hip pain, states it's still painful with lifting it in and out of the car and she is stiff when she first gets up to walk.          OBJECTIVE    Modalities Rationale: decrease pain and increase tissue extensibility to improve patient's ability to complete functional tasks with less pain.   10 min [x] Estim, type/location: IFC to left glute, Patient supine with LE wedge                                     []  att     [x]  unatt     []  w/US     []  w/ice    [x]  w/heat    min []  Mechanical Traction: type/lbs                   []  pro   []  sup   []  int   []  cont    []  before manual    []  after manual    min []  Ultrasound, settings/location:      min []  Iontophoresis w/ dexamethasone, location:                                               []  take home patch       []  in clinic    min []  Ice     []  Heat    location/position:     min []  Vasopneumatic Device, press/temp:     min []  Other:    [x] Skin assessment post-treatment (if applicable):    [x]  intact    [x]  redness- no adverse reaction     []redness  adverse reaction:      39 min Therapeutic Exercise:  [x]  See flow sheet   Rationale: Increase LE ROM/flexibility, increase strength and increase proprioception to improve the patients ability to perform ADL's and gait safely and I to allow for increased activity tolerance and increased functional mobility. min Patient Education:  YES  Reviewed HEP   []  Progressed/Changed HEP based on:   Pt reports compliance with HEP. Other Objective/Functional Measures:                                              AROM(PROM)       Strength (1-5)      Left Right Left Right   Hip Flexion (0-120)  90(105) WFL  4- 4+     Extension (0-20)     3- 3-     IR (0-45) 30 33 5 5     ER (0-45) 30 29 4+ 4+           Post Treatment Pain Level (on 0 to 10) scale:   4  / 10     ASSESSMENT    Assessment/Changes in Function:     Pt demo's improved left hip flexion AROM since  initial evaluation. Pt continues with painful left hip flexion with ADL's. [x]  See Progress Note/Recertification   Patient will continue to benefit from skilled PT services to modify and progress therapeutic interventions, address functional mobility deficits, address ROM deficits, address strength deficits, analyze and address soft tissue restrictions, analyze and cue movement patterns, assess and modify postural abnormalities, and instruct in home and community integration to attain remaining goals. Progress toward goals / Updated goals:    1. Pt will be compliant with HEP for symptom management at home. MET  2. Pt will demonstrate left hip ER AROM to at least 25 deg to improve stance phase.  MET     PLAN    []  Upgrade activities as tolerated YES Continue plan of care   []  Discharge due to :    []  Other:      Therapist: Selena Ramsey PTA    Date: 9/8/2021 Time: 8:32 AM     Future Appointments   Date Time Provider Napoleon Ahmadi   9/8/2021  9:30 AM Danitza Nugent [facility]   9/13/2021  9:30 AM Laurie Maravilla PT [facility]   9/15/2021  9:30 AM Danitza Nugent [facility]   9/21/2021  9:30 AM Danitza ROLAND [facility]   9/23/2021  9:30  [address], [address] [place] [address] Road [facility]   9/27/2021  9:30 AM VINEET LuceroPTLISET [facility]   9/29/2021  9:30 AM VINEET Lucero [facility]

## 2021-09-08 NOTE — PROGRESS NOTES
[facility] PHYSICAL THERAPY  [address] [facility] [place], [address] - Phone: [phone]  Fax: [phone] [facility] FOR PHYSICAL THERAPY          Patient Name: Shu Be : 1940   Treatment/Medical Diagnosis: Left hip pain [M25.552]   Onset Date: 2021    Referral Source: Marie Szymanski MD Start of Care [facility]): 2021   Prior Hospitalization: See Medical History Provider #: [id number]   Prior Level of Function: History of back pain   Comorbidities: OA, Thyroid Problems   Medications: Verified on Patient Summary List   Vis[facility] from [facility]: 10 Missed Visits: 0     Goal/Measure of Progress Goal Met?   1.  1. Pt will be compliant with HEP for symptom management at home. Status at last Eval: HEP issued Current Status: Pt compliant with HEP yes   2.  2. Pt will demonstrate left hip ER AROM to at least 25 deg to improve stance phase. Status at last Eval: 20 deg Current Status: 29 deg yes     Key Functional Changes/Progress: Pt demo's good tolerance to therapeutic activity, but progressing slowly with symptoms of left hip pain. Pt continues with c/o left hip pain (glutes and quad) with active flexion, lateral and rotational movement of left hip and with tenderness to palpation left glutes.  Pt demo's improved AROM in left hip, but still demo's antalgic gait and c/o pain  and limited mobility getting up from a chair, with bed mobility and transfering in and out of a car.                  AROM(PROM)       Strength (1-5)      Left Right Left Right   Hip Flexion (0-120)  90(105) WFL  4- 4+     Extension (0-20)     3- 3-     IR (0-45) 30 33 5 5     ER (0-45) 30 29 4+ 4+        Problem List: pain affecting function, decrease ROM, decrease strength, impaired gait/ balance, decrease ADL/ functional abilitiies, decrease activity tolerance, decrease flexibility/ joint mobility and decrease transfer abilities              Treatment Plan may include any combination of the following: Therapeutic exercise, Therapeutic activities, Neuromuscular re-education, Physical agent/modality, Gait/balance training, Manual therapy, Patient education, Self Care training, Functional mobility training, Home safety training and Stair training  Patient Goal(s) has been updated and includes:  lift left leg better           Goals for this certification period include and are to be achieved in   2-4  weeks:  1. Pt will be independent with HEP at D/C for self management. 2. Pt will demonstrate left hip flexion AROM to at least 105 deg to improve stair negotiation. 3. Pt will ambulate 300 feet with LRAD to improve quality of life. 4. Pt will increase FOTO Functional Status score to 63 to decrease functional limitations. 5. Pt will demonstrate left hip flexion strength of at least 4+/5 to engage in age appropriate activities. Frequency / Duration:   Patient to be seen   2-3   times per week for   2-4    weeks:    Assessments/Recommendations: Pt would benefit from continued therapy to address left hip pain and decreased AROM to allow for improved tolerance and functional mobility with transfers, gait and ADL's. If you have any questions/comments please contact us directly at [phone]. Thank you for allowing us to assist in the care of your patient. PTA Signature  Therapist Signature: ANA PAULA Barnes, DPRACHEL Date: 0/3/7692   Certification Period:  Reporting Period: 8/6/2021 - 11/5/2021 8/6/2021 - 09/08/2021 Time: 8:34 AM   NOTE TO PHYSICIAN:  PLEASE COMPLETE THE ORDERS BELOW AND FAX TO   [facility]: [phone]   If you are unable to process this request in 24 hours please contact our office: [phone].    ___ I have read the above report and request that my patient continue as recommended.   ___ I have read the above report and request that my patient continue therapy with the following changes/special instructions: ________________________________________________   ___ I have read the above report and request that my patient be discharged from therapy.      Physician Signature:        Date:       Time:                                   Jovany Cooper MD

## 2021-09-13 ENCOUNTER — HOSPITAL ENCOUNTER (OUTPATIENT)
Dept: PHYSICAL THERAPY | Age: 81
Discharge: HOME | End: 2021-09-13
Payer: MEDICARE

## 2021-09-13 PROCEDURE — 97110 THERAPEUTIC EXERCISES: CPT

## 2021-09-13 PROCEDURE — 97014 ELECTRIC STIMULATION THERAPY: CPT

## 2021-09-13 PROCEDURE — 97530 THERAPEUTIC ACTIVITIES: CPT

## 2021-09-13 NOTE — PROGRESS NOTES
PHYSICAL THERAPY - DAILY TREATMENT NOTE    Patient Name: Oracio Mcclure        Date: 2021  : 1940   YES Patient  Verified  Visit #:     Insurance: Payor: Jose Angelica / Plan: [place] MEDICARE PART A & B / Product Type: Medicare /      In time: 9:25 Out time: 10:20   Total Treatment Time: 55     Medicare/BCBS [place] Time Tracking (below)   Total Timed Codes (min):  45 1:1 Treatment Time:  45     TREATMENT AREA =  Left hip    SUBJECTIVE    Pain Level (on 0 to 10 scale):  2  / 10   Medication Changes/New allergies or changes in medical history, any new surgeries or procedures?     NO    If yes, update Summary List   Subjective Functional Status/Changes:  []  No changes reported     \"I think Im doing OK today\"          OBJECTIVE    Modalities Rationale:    decrease pain and increase tissue extensibility to improve patient's ability to decrease muscle tension/pain  10 min [x] Estim, type/location: Supine with wedge                                     []  att     [x]  unatt     []  w/US     []  w/ice    [x]  w/heat    min []  Mechanical Traction: type/lbs                   []  pro   []  sup   []  int   []  cont    []  before manual    []  after manual    min []  Ultrasound, settings/location:      min []  Iontophoresis w/ dexamethasone, location:                                               []  take home patch-6hour remove at        []  in clinic    min []  Ice     []  Heat    location/position:     min []  Vasopneumatic Device, press/temp:     min []  Other:    [x] Skin assessment post-treatment (if applicable):    [x]  intact    []  redness- no adverse reaction     []redness  adverse reaction:      35 min Therapeutic Exercise:  [x]  See flow sheet   Rationale:      increase ROM and increase strength to improve the patients ability to amb without pain     10 min Therapeutic Activity: Step-ups, and hip abd and ext   Rationale:   Increase ease with steps in community     min Patient Education:  YES  Reviewed HEP   []  Progressed/Changed HEP based on:   Cont HEP     Other Objective/Functional Measures:  Report of mild discomfort with step-ups at last reps and during end range hip flexion   Trialed SLR flex however unable due to pain   Noted supine left hip innominate rotation. MET to correct left hip post rotation. Mild improved symmetry       Post Treatment Pain Level (on 0 to 10) scale:   5-6  / 10     ASSESSMENT    Assessment/Changes in Function:     Possible relevant SIJ component     []  See Progress Note/Recertification   Patient will continue to benefit from skilled PT services to analyze, cue, progress, modify,, demonstrate, instruct, and address, movement patterns, therapeutic interventions, postural abnormalities, soft tissue restrictions, ROM, strength, functional mobility, body mechanics/ergonomics, and home and community integration, to attain remaining goals.    Progress toward goals / Updated goals:    No changes since last reassess     PLAN    []  Upgrade activities as tolerated YES Continue plan of care   []  Discharge due to :    []  Other:      Therapist: Elizabeth Landa DPT    Date: 9/13/2021 Time: 9:41 AM     Future Appointments   Date Time Provider Napoleon Ahmadi   9/15/2021  9:30 AM Spring Alves [facility]   9/21/2021  9:30 AM Spring Alves [facility]   9/23/2021  9:30 AM Spring Mclaughlink [facility]   9/27/2021  9:30 AM Jared Hareelor, PT [facility]   9/29/2021  9:30 AM Thiago Rivera, PT [facility]

## 2021-09-15 ENCOUNTER — HOSPITAL ENCOUNTER (OUTPATIENT)
Dept: PHYSICAL THERAPY | Age: 81
Discharge: HOME | End: 2021-09-15
Payer: MEDICARE

## 2021-09-15 PROCEDURE — 97110 THERAPEUTIC EXERCISES: CPT

## 2021-09-15 PROCEDURE — 97014 ELECTRIC STIMULATION THERAPY: CPT

## 2021-09-15 NOTE — PROGRESS NOTES
PHYSICAL THERAPY - DAILY TREATMENT NOTE    Patient Name: Rufus Calderon        Date: 9/15/2021  : 1940   YES Patient  Verified  Visit #:     Insurance: Payor: Zacarias Higuera / Plan: VA MEDICARE PART A & B / Product Type: Medicare /      In time: 9:23 Out time: 10:30   Total Treatment Time: 67     Medicare/BCBS [place] Time Tracking (below)   Total Timed Codes (min): 52 1:1 Treatment Time:  52     TREATMENT AREA =  Left hip pain [M25.552]    SUBJECTIVE    Pain Level (on 0 to 10 scale):  3  / 10   Medication Changes/New allergies or changes in medical history, any new surgeries or procedures? NO    If yes, update Summary List   Subjective Functional Status/Changes:  []  No changes reported     \"I've been cheating a little bit, I''m using pain medication. \"     Pt reports she has been putting a pain patch on her left leg to help with the pain, states her left knee has been getting sore, mostly notices it first thing in the mornings.        OBJECTIVE    Modalities Rationale:   decrease pain and increase tissue extensibility to improve patient's ability to decrease muscle tension/pain  15 min [x] Estim, type/location: IFC to left glute, Patient supine with LE wedge                                     []  att     [x]  unatt     []  w/US     []  w/ice    [x]  w/heat    min []  Mechanical Traction: type/lbs                   []  pro   []  sup   []  int   []  cont    []  before manual    []  after manual    min []  Ultrasound, settings/location:      min []  Iontophoresis w/ dexamethasone, location:                                               []  take home patch       []  in clinic    min []  Ice     []  Heat    location/position:     min []  Vasopneumatic Device, press/temp:     min []  Other:    [x] Skin assessment post-treatment (if applicable):    []  intact    [x]  redness- no adverse reaction     []redness  adverse reaction:      52 min Therapeutic Exercise:  [x]  See flow sheet Rationale:    increase ROM, increase strength, improve coordination, improve balance and increase proprioception to promote increased functional mobility and increased activity tolerance with ADL's.     min Patient Education:  YES  Reviewed HEP   []  Progressed/Changed HEP based on:   Patient with no questions, continue same HEP     Other Objective/Functional Measures:    Pt with c/o increased pain left anterior thigh with active hip flexion (step ups). Added SKTC, pt with good tolerance. Pt c/o cramping in right hip with seated IR/ER strengthening ex. Post Treatment Pain Level (on 0 to 10) scale:   3  / 10     ASSESSMENT    Assessment/Changes in Function:     Pt with decreased tolerance to hip flexion left LE with therapeutic activity  this session. Pt's symptoms fluctuate from day to day. []  See Progress Note/Recertification   Patient will continue to benefit from skilled PT services to modify and progress therapeutic interventions, address functional mobility deficits, address ROM deficits, address strength deficits, analyze and address soft tissue restrictions, analyze and cue movement patterns, assess and modify postural abnormalities, and instruct in home and community integration to attain remaining goals. Progress toward goals / Updated goals:    1. Pt will be independent with HEP at D/C for self management. 2. Pt will demonstrate left hip flexion AROM to at [facility] to improve stair negotiation. SKTC to facilitate hip flexion ROM   3. Pt will ambulate 300 feet with LRAD to improve quality of life. 4. Pt will increase FOTO Functional Status score to 63 to decrease functional limitations.   5. Pt will demonstrate left hip flexion strength of at least 4+/5 to engage in age appropriate activities     PLAN    []  Upgrade activities as tolerated YES Continue plan of care   []  Discharge due to :    []  Other:      Therapist: Constantine Molina PTA    Date: 9/15/2021 Time: 9:40 AM     Future Appointments   Date Time Provider Napoleon Ahmadi   9/21/2021  9:30 AM Reynold [facility]   9/23/2021  9:30 AM Reynold [facility]   9/27/2021  9:30 AM Sherwin Bernheim, PT [facility]   9/29/2021  9:30 AM Zoila Hare, PT [facility]

## 2021-09-21 ENCOUNTER — HOSPITAL ENCOUNTER (OUTPATIENT)
Dept: PHYSICAL THERAPY | Age: 81
Discharge: HOME | End: 2021-09-21
Payer: MEDICARE

## 2021-09-21 PROCEDURE — 97140 MANUAL THERAPY 1/> REGIONS: CPT

## 2021-09-21 PROCEDURE — 97110 THERAPEUTIC EXERCISES: CPT

## 2021-09-21 PROCEDURE — 97014 ELECTRIC STIMULATION THERAPY: CPT

## 2021-09-21 NOTE — PROGRESS NOTES
PHYSICAL THERAPY - DAILY TREATMENT NOTE    Patient Name: Alen Gilman        Date: 2021  : 1940   YES Patient  Verified  Visit #:   15   of   12-18  Insurance: Payor: Curry Singletary / Plan: VA MEDICARE PART A & B / Product Type: Medicare /      In time: 9:30 Out time: 10:17   Total Treatment Time: 47     Medicare/BCBS [place] Time Tracking (below)   Total Timed Codes (min): 32  1:1 Treatment Time:  32     TREATMENT AREA =  Left hip pain [M25.552]    SUBJECTIVE    Pain Level (on 0 to 10 scale):  3  / 10   Medication Changes/New allergies or changes in medical history, any new surgeries or procedures? NO    If yes, update Summary List   Subjective Functional Status/Changes:  []  No changes reported     Pt reports she was feeling pretty good, but yesterday she starting to have soreness on her right side too (pt indicates right SI). Pt states she feels this mostly with sitting and when she gets up from sitting. Pt states she called the doctor's office and has an appointment with Dr. Erika Reyes this afternoon.         OBJECTIVE    Modalities Rationale: decrease pain and increase tissue extensibility to improve patient's ability to decrease muscle tension/pain  15 min [x] Estim, type/location: IFC to left hip, Patient supine with LE wedge                                     []  att     [x]  unatt     []  w/US     []  w/ice    [x]  w/heat    min []  Mechanical Traction: type/lbs                   []  pro   []  sup   []  int   []  cont    []  before manual    []  after manual    min []  Ultrasound, settings/location:      min []  Iontophoresis w/ dexamethasone, location:                                               []  take home patch       []  in clinic    min []  Ice     []  Heat    location/position:     min []  Vasopneumatic Device, press/temp:     min []  Other:    [x] Skin assessment post-treatment (if applicable):    [x]  intact    [x]  redness- no adverse reaction     []redness  adverse reaction:      22 min Therapeutic Exercise:  [x]  See flow sheet   Rationale:    increase ROM, increase strength, improve coordination, improve balance and increase proprioception to promote increased functional mobility and increased activity tolerance with ADL's. 10 min Manual Therapy: The manual therapy interventions were performed at a separate and distinct time from the therapeutic activities interventions. STM left>rigth piriformis with pt in S/L.     grade 3-4 hip distraction with pt supine    Rationale:  decrease pain, increase tissue extensibility and decrease trigger points to improve patient's ability to perform ADL's with greater ease and less pain. min Patient Education:  YES  Reviewed HEP   []  Progressed/Changed HEP based on:   Pt to trial MICHA for management of B SI soreness after sitting. Other Objective/Functional Measures:    Pt with TTP left>right PSIS. Pt c/o pain in anterior left hip with palpation to left piriformis. Trial of left hip distraction, pt reports decreased pain during, but no change in symptoms with sitting EOB. Post Treatment Pain Level (on 0 to 10) scale:   2  / 10     ASSESSMENT    Assessment/Changes in Function:     Pt reports new symptom of right SI soreness with prolonged sitting. []  See Progress Note/Recertification   Patient will continue to benefit from skilled PT services to modify and progress therapeutic interventions, address functional mobility deficits, address ROM deficits, address strength deficits, analyze and address soft tissue restrictions, analyze and cue movement patterns, assess and modify postural abnormalities, and instruct in home and community integration to attain remaining goals. Progress toward goals / Updated goals:    1. Pt will be independent with HEP at D/C for self management. 2. Pt will demonstrate left hip flexion AROM to at [place] deg to improve stair negotiation.   3. Pt will ambulate 300 feet with LRAD to improve quality of life. 4. Pt will increase FOTO Functional Status score to 63 to decrease functional limitations. pt to follow up with referring MD regarding continued symptoms  5. Pt will demonstrate left hip flexion strength of at least 4+/5 to engage in age appropriate activities.       PLAN    []  Upgrade activities as tolerated YES Continue plan of care   []  Discharge due to :    []  Other:      Therapist: Emely Jones PTA    Date: 9/21/2021 Time: 11:20 AM     Future Appointments   Date Time Provider Napoleon Ahmadi   9/23/2021  9:30 AM Mey Heath [facility]   9/27/2021  9:30 AM [facility] PT [address] [facility]   9/29/2021  9:30 AM [address] [facility]

## 2021-09-23 ENCOUNTER — APPOINTMENT (OUTPATIENT)
Dept: PHYSICAL THERAPY | Age: 81
End: 2021-09-23
Payer: MEDICARE

## 2021-09-23 NOTE — PROGRESS NOTES
[facility] PHYSICAL THERAPY  [address] [facility] [address], [place], [address] - Phone: [phone]  Fax: [phone]  DISCHARGE SUMMARY FOR PHYSICAL THERAPY          Patient Name: Johnnie Johnson : 1940   Treatment/Medical Diagnosis: Left hip pain [M25.552]   Onset Date: 21      Referral Source: Jes Stover MD [facility] [place]): 21   Prior Hospitalization: See Medical History Provider #: [id number]   Prior Level of Function: History of back pain   Comorbidities: OA, thyroid problems    Medications: Verified on Patient Summary List   Vis[facility] from [facility]: 13 Missed Visits: 0       Key Functional Changes/Progress: Pt continued with c/o average 2-3/10 left hip pain with ADL's and reported having \"good and bad days\" with symptoms of pain. Pt's main c/o is pain with lifting left LE (left LE hip flexion) with activities such as transferring in/out of car and getting in and out of bed. Pt also reports pain and stiffness after prolonged sitting causing her to ambulate with antalgic gait. Pt had follow up with referring doctor regarding continued symptoms and they recommended discharge at this time. Assessments/Recommendations: Other: Pt's referring doctor recommends discharge     If you have any questions/comments please contact us directly at [phone]. Thank you for allowing us to assist in the care of your patient. PTA Signature: Therapist Signature  ANA PAULA Liriano DPT   Date: 2021   Reporting Period: 21-21 Time: 10:06 AM     NOTE TO PHYSICIAN:  PLEASE COMPLETE THE ORDERS BELOW AND FAX TO   [facility]: [phone]. If you are unable to process this request in 24 hours please contact our office: [phone].    ___ I have read the above report and request that my patient be discharged from therapy.      Physician Signature:        Date:______Time: Yolanda Holman MD

## 2021-09-27 ENCOUNTER — APPOINTMENT (OUTPATIENT)
Dept: PHYSICAL THERAPY | Age: 81
End: 2021-09-27
Payer: MEDICARE

## 2021-09-29 ENCOUNTER — APPOINTMENT (OUTPATIENT)
Dept: PHYSICAL THERAPY | Age: 81
End: 2021-09-29
Payer: MEDICARE